# Patient Record
Sex: MALE | Race: WHITE | NOT HISPANIC OR LATINO | Employment: FULL TIME | URBAN - METROPOLITAN AREA
[De-identification: names, ages, dates, MRNs, and addresses within clinical notes are randomized per-mention and may not be internally consistent; named-entity substitution may affect disease eponyms.]

---

## 2020-10-22 ENCOUNTER — APPOINTMENT (OUTPATIENT)
Dept: RADIOLOGY | Facility: CLINIC | Age: 55
End: 2020-10-22
Payer: OTHER MISCELLANEOUS

## 2020-10-22 ENCOUNTER — OFFICE VISIT (OUTPATIENT)
Dept: OBGYN CLINIC | Facility: CLINIC | Age: 55
End: 2020-10-22
Payer: OTHER MISCELLANEOUS

## 2020-10-22 VITALS
SYSTOLIC BLOOD PRESSURE: 141 MMHG | DIASTOLIC BLOOD PRESSURE: 80 MMHG | HEIGHT: 72 IN | TEMPERATURE: 98.5 F | WEIGHT: 230 LBS | BODY MASS INDEX: 31.15 KG/M2

## 2020-10-22 DIAGNOSIS — M25.561 RIGHT KNEE PAIN, UNSPECIFIED CHRONICITY: ICD-10-CM

## 2020-10-22 DIAGNOSIS — M17.11 PRIMARY OSTEOARTHRITIS OF RIGHT KNEE: Primary | ICD-10-CM

## 2020-10-22 PROCEDURE — 77073 BONE LENGTH STUDIES: CPT

## 2020-10-22 PROCEDURE — 73564 X-RAY EXAM KNEE 4 OR MORE: CPT

## 2020-10-22 PROCEDURE — 99204 OFFICE O/P NEW MOD 45 MIN: CPT | Performed by: ORTHOPAEDIC SURGERY

## 2020-10-22 RX ORDER — PRAVASTATIN SODIUM 20 MG
20 TABLET ORAL DAILY
COMMUNITY

## 2020-10-22 RX ORDER — FENOFIBRATE 160 MG/1
160 TABLET ORAL DAILY
COMMUNITY

## 2020-10-22 RX ORDER — CARVEDILOL 12.5 MG/1
12.5 TABLET ORAL 2 TIMES DAILY WITH MEALS
COMMUNITY

## 2020-10-26 ENCOUNTER — EVALUATION (OUTPATIENT)
Dept: PHYSICAL THERAPY | Facility: CLINIC | Age: 55
End: 2020-10-26
Payer: OTHER MISCELLANEOUS

## 2020-10-26 DIAGNOSIS — M25.561 RIGHT KNEE PAIN, UNSPECIFIED CHRONICITY: ICD-10-CM

## 2020-10-26 DIAGNOSIS — M17.11 PRIMARY OSTEOARTHRITIS OF RIGHT KNEE: Primary | ICD-10-CM

## 2020-10-26 PROCEDURE — 97110 THERAPEUTIC EXERCISES: CPT | Performed by: PHYSICAL THERAPIST

## 2020-10-26 PROCEDURE — 97162 PT EVAL MOD COMPLEX 30 MIN: CPT | Performed by: PHYSICAL THERAPIST

## 2020-11-03 ENCOUNTER — OFFICE VISIT (OUTPATIENT)
Dept: PHYSICAL THERAPY | Facility: CLINIC | Age: 55
End: 2020-11-03
Payer: OTHER MISCELLANEOUS

## 2020-11-03 DIAGNOSIS — M25.561 RIGHT KNEE PAIN, UNSPECIFIED CHRONICITY: ICD-10-CM

## 2020-11-03 DIAGNOSIS — M17.11 PRIMARY OSTEOARTHRITIS OF RIGHT KNEE: Primary | ICD-10-CM

## 2020-11-03 PROCEDURE — 97110 THERAPEUTIC EXERCISES: CPT | Performed by: PHYSICAL THERAPIST

## 2020-11-03 PROCEDURE — 97140 MANUAL THERAPY 1/> REGIONS: CPT | Performed by: PHYSICAL THERAPIST

## 2020-11-10 ENCOUNTER — OFFICE VISIT (OUTPATIENT)
Dept: PHYSICAL THERAPY | Facility: CLINIC | Age: 55
End: 2020-11-10
Payer: OTHER MISCELLANEOUS

## 2020-11-10 DIAGNOSIS — M17.11 PRIMARY OSTEOARTHRITIS OF RIGHT KNEE: Primary | ICD-10-CM

## 2020-11-10 DIAGNOSIS — M25.561 RIGHT KNEE PAIN, UNSPECIFIED CHRONICITY: ICD-10-CM

## 2020-11-10 PROCEDURE — 97140 MANUAL THERAPY 1/> REGIONS: CPT | Performed by: PHYSICAL THERAPIST

## 2020-11-10 PROCEDURE — 97110 THERAPEUTIC EXERCISES: CPT | Performed by: PHYSICAL THERAPIST

## 2020-11-12 ENCOUNTER — OFFICE VISIT (OUTPATIENT)
Dept: PHYSICAL THERAPY | Facility: CLINIC | Age: 55
End: 2020-11-12
Payer: OTHER MISCELLANEOUS

## 2020-11-12 DIAGNOSIS — M25.561 RIGHT KNEE PAIN, UNSPECIFIED CHRONICITY: ICD-10-CM

## 2020-11-12 DIAGNOSIS — M17.11 PRIMARY OSTEOARTHRITIS OF RIGHT KNEE: Primary | ICD-10-CM

## 2020-11-12 PROCEDURE — 97140 MANUAL THERAPY 1/> REGIONS: CPT | Performed by: PHYSICAL THERAPIST

## 2020-11-12 PROCEDURE — 97110 THERAPEUTIC EXERCISES: CPT | Performed by: PHYSICAL THERAPIST

## 2020-11-17 ENCOUNTER — OFFICE VISIT (OUTPATIENT)
Dept: PHYSICAL THERAPY | Facility: CLINIC | Age: 55
End: 2020-11-17
Payer: OTHER MISCELLANEOUS

## 2020-11-17 DIAGNOSIS — M25.561 RIGHT KNEE PAIN, UNSPECIFIED CHRONICITY: ICD-10-CM

## 2020-11-17 DIAGNOSIS — M17.11 PRIMARY OSTEOARTHRITIS OF RIGHT KNEE: Primary | ICD-10-CM

## 2020-11-17 PROCEDURE — 97110 THERAPEUTIC EXERCISES: CPT | Performed by: PHYSICAL THERAPIST

## 2020-11-17 PROCEDURE — 97112 NEUROMUSCULAR REEDUCATION: CPT | Performed by: PHYSICAL THERAPIST

## 2020-11-17 PROCEDURE — 97530 THERAPEUTIC ACTIVITIES: CPT | Performed by: PHYSICAL THERAPIST

## 2020-11-17 PROCEDURE — 97140 MANUAL THERAPY 1/> REGIONS: CPT | Performed by: PHYSICAL THERAPIST

## 2020-11-20 ENCOUNTER — OFFICE VISIT (OUTPATIENT)
Dept: OBGYN CLINIC | Facility: CLINIC | Age: 55
End: 2020-11-20
Payer: OTHER MISCELLANEOUS

## 2020-11-20 VITALS
HEIGHT: 72 IN | DIASTOLIC BLOOD PRESSURE: 70 MMHG | WEIGHT: 230 LBS | TEMPERATURE: 98.1 F | BODY MASS INDEX: 31.15 KG/M2 | SYSTOLIC BLOOD PRESSURE: 123 MMHG

## 2020-11-20 DIAGNOSIS — M17.11 PRIMARY OSTEOARTHRITIS OF RIGHT KNEE: Primary | ICD-10-CM

## 2020-11-20 PROCEDURE — 99213 OFFICE O/P EST LOW 20 MIN: CPT | Performed by: ORTHOPAEDIC SURGERY

## 2020-11-24 ENCOUNTER — APPOINTMENT (OUTPATIENT)
Dept: PHYSICAL THERAPY | Facility: CLINIC | Age: 55
End: 2020-11-24
Payer: OTHER MISCELLANEOUS

## 2020-11-30 ENCOUNTER — OFFICE VISIT (OUTPATIENT)
Dept: PHYSICAL THERAPY | Facility: CLINIC | Age: 55
End: 2020-11-30
Payer: OTHER MISCELLANEOUS

## 2020-11-30 DIAGNOSIS — M25.561 RIGHT KNEE PAIN, UNSPECIFIED CHRONICITY: ICD-10-CM

## 2020-11-30 DIAGNOSIS — M17.11 PRIMARY OSTEOARTHRITIS OF RIGHT KNEE: Primary | ICD-10-CM

## 2020-11-30 PROCEDURE — 97530 THERAPEUTIC ACTIVITIES: CPT | Performed by: PHYSICAL THERAPIST

## 2020-11-30 PROCEDURE — 97112 NEUROMUSCULAR REEDUCATION: CPT | Performed by: PHYSICAL THERAPIST

## 2020-12-07 ENCOUNTER — OFFICE VISIT (OUTPATIENT)
Dept: PHYSICAL THERAPY | Facility: CLINIC | Age: 55
End: 2020-12-07
Payer: OTHER MISCELLANEOUS

## 2020-12-07 DIAGNOSIS — M17.11 PRIMARY OSTEOARTHRITIS OF RIGHT KNEE: Primary | ICD-10-CM

## 2020-12-07 DIAGNOSIS — M25.561 RIGHT KNEE PAIN, UNSPECIFIED CHRONICITY: ICD-10-CM

## 2020-12-07 PROCEDURE — 97110 THERAPEUTIC EXERCISES: CPT | Performed by: PHYSICAL THERAPIST

## 2020-12-14 ENCOUNTER — OFFICE VISIT (OUTPATIENT)
Dept: PHYSICAL THERAPY | Facility: CLINIC | Age: 55
End: 2020-12-14
Payer: OTHER MISCELLANEOUS

## 2020-12-14 DIAGNOSIS — M25.561 RIGHT KNEE PAIN, UNSPECIFIED CHRONICITY: ICD-10-CM

## 2020-12-14 DIAGNOSIS — M17.11 PRIMARY OSTEOARTHRITIS OF RIGHT KNEE: Primary | ICD-10-CM

## 2020-12-14 PROCEDURE — 97110 THERAPEUTIC EXERCISES: CPT | Performed by: PHYSICAL THERAPIST

## 2020-12-14 PROCEDURE — 97530 THERAPEUTIC ACTIVITIES: CPT | Performed by: PHYSICAL THERAPIST

## 2020-12-21 ENCOUNTER — OFFICE VISIT (OUTPATIENT)
Dept: PHYSICAL THERAPY | Facility: CLINIC | Age: 55
End: 2020-12-21
Payer: OTHER MISCELLANEOUS

## 2020-12-21 DIAGNOSIS — M25.561 RIGHT KNEE PAIN, UNSPECIFIED CHRONICITY: ICD-10-CM

## 2020-12-21 DIAGNOSIS — M17.11 PRIMARY OSTEOARTHRITIS OF RIGHT KNEE: Primary | ICD-10-CM

## 2020-12-21 PROCEDURE — 97110 THERAPEUTIC EXERCISES: CPT | Performed by: PHYSICAL THERAPIST

## 2020-12-21 PROCEDURE — 97112 NEUROMUSCULAR REEDUCATION: CPT | Performed by: PHYSICAL THERAPIST

## 2020-12-23 ENCOUNTER — APPOINTMENT (OUTPATIENT)
Dept: PHYSICAL THERAPY | Facility: CLINIC | Age: 55
End: 2020-12-23
Payer: OTHER MISCELLANEOUS

## 2020-12-28 ENCOUNTER — OFFICE VISIT (OUTPATIENT)
Dept: PHYSICAL THERAPY | Facility: CLINIC | Age: 55
End: 2020-12-28
Payer: OTHER MISCELLANEOUS

## 2020-12-28 DIAGNOSIS — M17.11 PRIMARY OSTEOARTHRITIS OF RIGHT KNEE: Primary | ICD-10-CM

## 2020-12-28 DIAGNOSIS — M25.561 RIGHT KNEE PAIN, UNSPECIFIED CHRONICITY: ICD-10-CM

## 2020-12-28 PROCEDURE — 97112 NEUROMUSCULAR REEDUCATION: CPT

## 2020-12-28 PROCEDURE — 97530 THERAPEUTIC ACTIVITIES: CPT

## 2020-12-28 PROCEDURE — 97110 THERAPEUTIC EXERCISES: CPT

## 2020-12-30 ENCOUNTER — APPOINTMENT (OUTPATIENT)
Dept: PHYSICAL THERAPY | Facility: CLINIC | Age: 55
End: 2020-12-30
Payer: OTHER MISCELLANEOUS

## 2021-01-04 ENCOUNTER — OFFICE VISIT (OUTPATIENT)
Dept: PHYSICAL THERAPY | Facility: CLINIC | Age: 56
End: 2021-01-04
Payer: OTHER MISCELLANEOUS

## 2021-01-04 DIAGNOSIS — M17.11 PRIMARY OSTEOARTHRITIS OF RIGHT KNEE: Primary | ICD-10-CM

## 2021-01-04 DIAGNOSIS — M25.561 RIGHT KNEE PAIN, UNSPECIFIED CHRONICITY: ICD-10-CM

## 2021-01-04 PROCEDURE — 97112 NEUROMUSCULAR REEDUCATION: CPT

## 2021-01-04 PROCEDURE — 97530 THERAPEUTIC ACTIVITIES: CPT

## 2021-01-04 PROCEDURE — 97110 THERAPEUTIC EXERCISES: CPT

## 2021-01-04 NOTE — PROGRESS NOTES
Daily Note     Today's date: 2021  Patient name: Kalpesh Licea  : 1965  MRN: [id number]  Referring provider: Will Kraft MD  Dx:   Encounter Diagnosis     ICD-10-CM    1  Primary osteoarthritis of right knee  M17 11    2  Right knee pain, unspecified chronicity  M25 561                   Subjective: Patient reports that his knee if feeling much better  He has a fallow-up with Dr Heath Ramírez next Monday  Objective: See treatment diary below      Assessment: Tolerated treatment well  Patient demonstrated good eccentric control and knee stability throughout session  Fatigue noted post session and patient would benefit from continued PT  Progress as tolerated  Plan: continue per plan of care       Precautions: R knee OA      Manuals  1   STM, PROM stretch JZ         jt mobs JZ         MRE's JZ                   Neuro Re-Ed  14   SLS rebounder R 3x10         Tandem          SLS foam rebounder R 3x10         Elizabeth abd kick 6/ 3x10ea         Lat step down hop  6" 3x10        Lat box hop  6" 3x10        U/l fwd/back hop  2x10 ea   2x15     SLS hip abd  3x10        2 foot lat square hop  3x10   2x15     Ther Ex  1   Hamstring stretch long sit          PKFS strap          Bridge figure 4          U/l heel raise 3x10  3x10       Calf stretch    :15x3 :15x5 :15x5 20"x5   Soleus stretch    :15x3 :15x5 :15x5 20"x5   Standing knee flexion with hip extension          TB side step          elizabeth kick back          3 way hamstring stretch  :15x3 :15x3       Elizabeth forward kick 12/ 3x10 ea         Slider lunge 3x10 3x10        LP u/l in PF high    110/ 2x12ea 150/ 3x10 150/ 3x10 150/ 3x10   LP b/l in PF mid    170/ 3x12 150/ 3x10 150/ 3x10 150/ 3x10   LP b/l in PF low     150/ 3x10 150/ 3x10 150/ 3x10   LP u/l ankle iso high K high A   110/ 1 min 2xea 110/ 1 min 1x ea  110/ 1 min 2x ea 110/ 1 min 2x ea   LP u/l ankle iso mid K high A   110/ 1 min 2xea 110/ 1 min 2x ea 190/ 1 min x 2 ea 190/ 1 min x2 ea 190/ 1 min x2 ea   U/l LP high A   70/ 3x10 u/l 70/ 3x10 u/l 90/ 3x10 90/ 3x10 90/ 3x10   TB ankle PF iso high k   3x10 M+B       LP u/l ankle iso deep K and high A   90/ 1 min ea x 2 90/1 min ea x 2       High wall sit u/l in PF    1 min x 2 ea      B/l calf press      150/ 3x10 150/ 3x10 150# 3x10             Ther Activity 11/17 11/30 12/7 12/14 12/21 12/28 1/4   Lateral step down 8" 3x10 8" 3x10  8" 3x10  8" 3x10 8" 3x10   Forward step down 8" 3x10 8" 3x10  8" 3x10  8" 3x10 8" 3x10   Lunge    3x10                          Gait Training                              Modalities 11/17 11/30 12/14 12/21               CT/vaso

## 2021-01-11 ENCOUNTER — OFFICE VISIT (OUTPATIENT)
Dept: OBGYN CLINIC | Facility: CLINIC | Age: 56
End: 2021-01-11
Payer: OTHER MISCELLANEOUS

## 2021-01-11 VITALS
WEIGHT: 236 LBS | SYSTOLIC BLOOD PRESSURE: 120 MMHG | DIASTOLIC BLOOD PRESSURE: 82 MMHG | BODY MASS INDEX: 31.97 KG/M2 | TEMPERATURE: 97.2 F | HEIGHT: 72 IN

## 2021-01-11 DIAGNOSIS — M17.11 PRIMARY OSTEOARTHRITIS OF RIGHT KNEE: Primary | ICD-10-CM

## 2021-01-11 PROCEDURE — 99213 OFFICE O/P EST LOW 20 MIN: CPT | Performed by: ORTHOPAEDIC SURGERY

## 2021-01-11 NOTE — PROGRESS NOTES
Orthopaedic Surgery Note    CC: Right Knee Pain      HPI:  Mr Jaycob Schmidt is a 60 y  o male with a history of right knee  Updated history:  Patient reports that he feels he has made complete recovery at this point in time  He is back to all his activities and he has no pain  He is very pleased with outcome at this point in time  Updated history:  Reports he is making progress but feels the knee is not completely back to normal   Feels progress with PT  He is working and this is not problematic  Previous history:  Patient presents as a workers comp case  He works as a  at a school  Patient was moving bleachers, picnic tables, etc at school and noted the start of soreness in the right knee - he does not note any specific injury that he can recall  He thinks that this started about 2 weeks ago  Pain had progressively gotten worse  He noted associated swelling as well  Pain is located on the anterior aspect of the knee and the posterior aspect of the proximal calf  Walking makes his pain worse  Icing, OTC NSAIDs, and staying off the knee makes it better  Pain impacts work and his ADL  He saw his PCP yesterday and received right knee steroid injection  He feels a lot better after the steroid injection, but not completely resolved  Patient denies previous injury, surgery, or injection  He has not done PT  Patient is still working, but has been sitting more and doing more sedentary work  Denies locking, instability, distal numbness or tingling  ALLERGIES:  No Known Allergies    CURRENT MEDICATIONS:  Current Outpatient Medications   Medication Sig Dispense Refill    carvedilol (COREG) 12 5 mg tablet Take 12 5 mg by mouth 2 (two) times a day with meals      fenofibrate (TRIGLIDE) 160 MG tablet Take 160 mg by mouth daily      pravastatin (PRAVACHOL) 20 mg tablet Take 20 mg by mouth daily       No current facility-administered medications for this visit          PAST MEDICAL HISTORY  History reviewed  No pertinent past medical history  SURGICAL HISTORY  Past Surgical History:   Procedure Laterality Date    SHOULDER SURGERY         FAMILY HISTORY  Family History   Problem Relation Age of Onset    Hypertension Mother     Glaucoma Father     Hyperlipidemia Father     Hypertension Father        SOCIAL HISTORY  Social History     Socioeconomic History    Marital status: /Civil Union     Spouse name: Not on file    Number of children: Not on file    Years of education: Not on file    Highest education level: Not on file   Occupational History    Not on file   Social Needs    Financial resource strain: Not on file    Food insecurity     Worry: Not on file     Inability: Not on file   [employer] needs     Medical: Not on file     Non-medical: Not on file   Tobacco Use    Smoking status: Never Smoker    Smokeless tobacco: Never Used   Substance and Sexual Activity    Alcohol use: Not on file    Drug use: Not on file    Sexual activity: Not on file   Lifestyle    Physical activity     Days per week: Not on file     Minutes per session: Not on file    Stress: Not on file   Relationships    Social connections     Talks on phone: Not on file     Gets together: Not on file     Attends Buddhism service: Not on file     Active member of club or organization: Not on file     Attends meetings of clubs or organizations: Not on file     Relationship status: Not on file    Intimate partner violence     Fear of current or ex partner: Not on file     Emotionally abused: Not on file     Physically abused: Not on file     Forced sexual activity: Not on file   Other Topics Concern    Not on file   Social History Narrative    Not on file         Review of Systems   Metal Allergy: no  History of MRSA: no  History of DVT or PE: no  Active dental issues: no  Anesthesia complications: no    Otherwise negative except per above and HPI      Physical Exam    Vitals  Vitals: 01/11/21 0912   BP: 120/82   Temp: (!) 97 2 °F (36 2 °C)       BMI  Body mass index is 32 01 kg/m²  GENERAL: No acute distress  Alert and oriented  Well nourished and well hydrated  Appears stated age  HEENT : Normocephalic, atraumatic  Extraocular movements intact  Mask in place  NECK: Supple, trachea midline  LUNGS: Adequate and symmetric respiratory effort  No intercostal retractions or accessory muscle use  HEART: Extremities warm and perfused  ABDOMEN: Nondistended  SKIN: Warm and dry, no rash  Right Knee   Inspection/Appearance:       Swelling: No      Patella is midline  Alignment:  Knee is in mild varus  Palpation - Soft Tissue: normal without effusion  ROM:       Extension - 0          Flexion - 120      extensor lag: no    Stability:  demonstrates no varus, valgus, anterior drawer or posterior drawer  Patella: stable, tracks normally  Angelic: negative  Imaging  No new imaging  Assessment and Plan  Right Knee Arthritis      - Reports that PT has really provided him significant relief  He reports he is back to all ADLs and home activities  He is very pleased  - Recommend continued PT and HEP and did demonstrate partial body weight squat to carolin zapien he can consider incorporating instead of squat machine he states he used at PT     - followup as needed, if repeat pain, would recommend CSI  Cleopatra Thao MD  Adult Reconstruction Surgery  [facility] [place] [id number]  9:43 AM

## 2021-01-14 NOTE — PROGRESS NOTES
Pt has achieved all of his functional goals   Stated that he will not be returning to PT because "I just don't need it any more "

## 2022-08-11 ENCOUNTER — TELEPHONE (OUTPATIENT)
Dept: OBGYN CLINIC | Facility: HOSPITAL | Age: 57
End: 2022-08-11

## 2022-08-11 ENCOUNTER — OFFICE VISIT (OUTPATIENT)
Dept: OBGYN CLINIC | Facility: CLINIC | Age: 57
End: 2022-08-11
Payer: OTHER MISCELLANEOUS

## 2022-08-11 VITALS
SYSTOLIC BLOOD PRESSURE: 122 MMHG | BODY MASS INDEX: 31.97 KG/M2 | WEIGHT: 236 LBS | DIASTOLIC BLOOD PRESSURE: 78 MMHG | HEIGHT: 72 IN

## 2022-08-11 DIAGNOSIS — M23.92 LOCKING KNEE, LEFT: ICD-10-CM

## 2022-08-11 DIAGNOSIS — M25.562 ACUTE PAIN OF LEFT KNEE: Primary | ICD-10-CM

## 2022-08-11 DIAGNOSIS — M25.562 KNEE MENISCUS PAIN, LEFT: ICD-10-CM

## 2022-08-11 DIAGNOSIS — S83.105A ACUTE TRAUMATIC INTERNAL DERANGEMENT OF LEFT KNEE, INITIAL ENCOUNTER: ICD-10-CM

## 2022-08-11 PROCEDURE — 99213 OFFICE O/P EST LOW 20 MIN: CPT | Performed by: PHYSICIAN ASSISTANT

## 2022-08-11 NOTE — LETTER
August 11, 2022     Patient: Cuco Hector  YOB: 1965  Date of Visit: 8/11/2022      To Whom it May Concern:    Cuco Hector is under my professional care  Kdae Caicedo was seen in my office on 8/11/2022  Kade Caicedo may return to work with limitations :  Primarily sedentary duty only  Sit stand as needed with use of crutches  If you have any questions or concerns, please don't hesitate to call           Sincerely,          Amie Cochran PA-C        CC: No Recipients

## 2022-08-11 NOTE — PROGRESS NOTES
Orthopaedic Surgery - Office Note  Tray Santoyo (48 y o  male)   : 1965   MRN: [id number]  Encounter Date: 2022    Chief Complaint   Patient presents with    Left Knee - Pain         Assessment/Plan  Diagnoses and all orders for this visit:    Acute pain of left knee  -     MRI knee left  wo contrast; Future  -     Durable Medical Equipment  -     Ambulatory Referral to Physical Therapy; Future    Acute traumatic internal derangement of left knee, initial encounter  -     MRI knee left  wo contrast; Future  -     Durable Medical Equipment  -     Ambulatory Referral to Physical Therapy; Future    Knee meniscus pain, left  -     MRI knee left  wo contrast; Future  -     Durable Medical Equipment  -     Ambulatory Referral to Physical Therapy; Future    Locking knee, left  -     Durable Medical Equipment  -     Ambulatory Referral to Physical Therapy; Future    Augustina Whittaker had an acute work related injury on 2022  I am concerned for an acute meniscal tear which is causing significant mechanical symptoms and inability to bear weight  He will be given an Echo hinged knee sleeve and continue the crutches to avoid limping today  I will order an MRI to evaluate for an acute meniscus tear he will follow up to discuss the results with an orthopedic surgeon  In the interim he will be started in physical therapy to help maintain and range of motion and prevent muscle atrophy and wasting  Patient will ice the knee for 20 minutes on 1 hour off 3 times a day  He will use Aleve 1 tablet twice daily with food stopping calling if any stomach upset occurs  He will use Tylenol as needed for breakthrough pain  Patient will be given light duty restrictions today  Return to discuss the MRI with Dr Edith Kincaid  History of Present Illness  Augustina Whittaker is a new patient who was injured at work on 2022    He reports he was stepping up into a trailer to get a  whenever he felt a large pop and immediate pain in the left knee  He does not recall the exact mechanism injury  He was then seen at the emergency department where he had x-rays taken which are available for review  He reports that after the injury he was unable to bear weight and noted significant swelling in the knee  He has noticed that any type of twisting injury now causes a sharp pain in the inside and outside of the knee  He has had occasional locking in the knee since the injury with difficulty ambulating  He reports he is unable to tolerate the knee immobilizer but has been using the crutches from the emergency department  He denies problems with this knee in the past   He reports he is a very active individual and is a  at a local school  Review of Systems  Pertinent items are noted in HPI  All other systems were reviewed and are negative  Physical Exam  /78   Ht 6' (1 829 m)   Wt 107 kg (236 lb)   BMI 32 01 kg/m²   Cons: Appears well  No apparent distress  Psych: Alert  Oriented x3  Mood and affect normal   Eyes: PERRLA, EOMI  Resp: Normal effort  No audible wheezing or stridor  CV: Palpable pulse  No discernable arrhythmia  Lymph:  No palpable cervical, axillary, or inguinal lymphadenopathy  Skin: Warm  No palpable masses  No visible lesions  Neuro: Normal muscle tone  Normal and symmetric DTR's  Patient's left knee is with trace effusion in the office today  His exam is guarded secondary to pain  He is tender on the mediolateral joint line  He has no pain or instability to valgus or varus stressing  He has trace instability to Lachman's  No instability to posterior drawer  He has a positive medial Angelic's  Lateral Angelic's is equivocal due to patient guarding  He has a very guarded range of motion and will slowly get to extension of 0  Flexion is to about 110 with end motion pain  Quad and hamstring strength are decreased to 4/5    He is nontender to palpation on the mediolateral border the patella  He is nontender to palpation at the distal quad and patella tendon  He has a slight extensor lag but appears to have an intact extensor mechanism without any palpable defect  He has a negative straight leg raise  Gait is antalgic favoring the left side  He has pain with bearing weight  Studies Reviewed  X-ray images as well as reports were reviewed by myself in the office today and I agree with radiologist interpretation of no acute fracture or dislocation  Scanned emergency department notes were reviewed by myself in the office today  Procedures  No procedures today  Medical, Surgical, Family, and Social History  The patient's medical history, family history, and social history, were reviewed and updated as appropriate  History reviewed  No pertinent past medical history      Past Surgical History:   Procedure Laterality Date    SHOULDER SURGERY         Family History   Problem Relation Age of Onset   Earna Ame Hypertension Mother     Glaucoma Father     Hyperlipidemia Father     Hypertension Father        Social History     Occupational History    Not on file   Tobacco Use    Smoking status: Never Smoker    Smokeless tobacco: Never Used   Substance and Sexual Activity    Alcohol use: Not on file    Drug use: Not on file    Sexual activity: Not on file       No Known Allergies      Current Outpatient Medications:     carvedilol (COREG) 12 5 mg tablet, Take 12 5 mg by mouth 2 (two) times a day with meals, Disp: , Rfl:     fenofibrate (TRIGLIDE) 160 MG tablet, Take 160 mg by mouth daily, Disp: , Rfl:     pravastatin (PRAVACHOL) 20 mg tablet, Take 20 mg by mouth daily, Disp: , Rfl:       [employer], PA-C

## 2022-08-11 NOTE — PATIENT INSTRUCTIONS
Hinged Knee Brace   WHAT YOU NEED TO KNOW:   How might a hinged knee brace help me? A hinged knee brace can support and stabilize an injured knee  It can limit movement while your knee heals after injury or surgery  It may also reduce pain and pressure if you have arthritis in your knee  Your healthcare provider will tell you the kind of brace to use and how long to use it  How do I safely use a hinged knee brace? Get your knee brace fitted by your healthcare provider  It is very important that your brace is the right size for you and that it fits properly  Your healthcare provider will fit you with a custom brace or tell you where to buy a brace  When you put on the brace, make sure the hinges are in the right place  Fasten straps and loops correctly  Ask your healthcare provider if you have any questions about how to wear your brace properly  Wear your brace during sports or activities as directed  Also wear it during any activity that could injure your knee  Check the fit of the brace often  If it does not fit properly or slips out of place, it could cause more injury  Inspect your skin often  Your skin may become irritated, red, or dry where it rubs against the brace  Check your skin for sores or other problems  Ask your healthcare provider if you can cover sore areas with a bandage  Your provider may also recommend a cream to apply to the skin to soothe it  Ask your healthcare provider how to care for your brace  You may be able to wash the fabric with mild soap and water  Inspect your brace often  Do not wear your brace if it is damaged or broken  You may need to replace it if it becomes worn  Continue to stretch and strengthen your knee as directed  You may need to work with a physical therapist to strengthen your knee  Your healthcare provider will tell you which activities are safe for you, and how much activity you can get  When should I seek immediate care?    You have severe knee pain or swelling  You cannot move or put weight on your injured leg  When should I contact my healthcare provider? Your knee pain returns or becomes worse when you wear your brace  Your skin is sore or raw after you wear your brace  Your leg goes numb while you are wearing your brace  Your brace is damaged or broken  You have questions or concerns about your condition or care  CARE AGREEMENT:   You have the right to help plan your care  Learn about your health condition and how it may be treated  Discuss treatment options with your healthcare providers to decide what care you want to receive  You always have the right to refuse treatment  The above information is an  only  It is not intended as medical advice for individual conditions or treatments  Talk to your doctor, nurse or pharmacist before following any medical regimen to see if it is safe and effective for you  © Copyright [employer] 2022 Information is for End User's use only and may not be sold, redistributed or otherwise used for commercial purposes  All illustrations and images included in CareNotes® are the copyrighted property of A D A M , Inc  or [facility]  Meniscus Tear   WHAT YOU NEED TO KNOW:   What is a meniscus tear? A meniscus tear is a tear in the cartilage of your knee  The meniscus is a piece of cartilage (strong tissue) between your thighbone and shinbone  The meniscus helps to cushion your knee joint and keep it stable  What causes a meniscus tear? A meniscus tear can occur if you twist your knee  A meniscus tear can happen during sports that involve squatting and twisting the knee, such as football or basketball  Weak and thinned meniscus cartilage in older people can increase the risk for a meniscus tear  What are the signs and symptoms of a meniscus tear?    A pop or tear when the injury happens    Pain and swelling    Tenderness    Stiffness    Popping, catching, or locking of your knee    Not being able to extend your knee fully    How is a meniscus tear diagnosed? Your healthcare provider will examine your knee  He may bend your knee and then straighten and rotate it  He may also order x-rays and an MRI of your knee  An MRI takes pictures of your knee to show the meniscus tear  You may be given contrast liquid to help the tear show up better  Tell the healthcare provider if you have ever had an allergic reaction to contrast liquid  Do not enter the MRI room with anything metal  Metal can cause serious injury  Tell the healthcare provider if you have any metal in or on your body  How is a meniscus tear treated? Treatment depends on the type of tear you have  Some types of meniscus tears can heal on their own  You may need any of the following:  NSAIDs , such as ibuprofen, help decrease swelling, pain, and fever  This medicine is available with or without a doctor's order  NSAIDs can cause stomach bleeding or kidney problems in certain people  If you take blood thinner medicine, always ask if NSAIDs are safe for you  Always read the medicine label and follow directions  Do not give these medicines to children under 10months of age without direction from your child's healthcare provider  Rest  your knee  Avoid activities that make the swelling or pain worse  You may need to avoid putting weight on your leg while you have pain  Your healthcare provider may recommend that you use crutches  Apply ice  on your knee for 15 to 20 minutes every hour or as directed  Use an ice pack, or put crushed ice in a plastic bag  Cover it with a towel  Ice helps prevent tissue damage and decreases swelling and pain  Compress  your knee with an elastic bandage, air cast, medical boot, or splint to reduce swelling  Ask your healthcare provider which compression device to use, and how tight it should be  Elevate  your knee above the level of your heart as often as you can   This will help decrease swelling and pain  Prop your knee on pillows or blankets to keep it elevated comfortably  Surgery  may be needed if your symptoms do not improve  Your healthcare provider may trim away or repair damaged tissue  Call 911 for any of the following: Your arm or leg feels warm, tender, and painful  It may look swollen and red  When should I seek immediate care? You cannot move your knee at all  When should I contact my healthcare provider? Your symptoms do not improve with treatment  You have questions or concerns about your condition or care  CARE AGREEMENT:   You have the right to help plan your care  Learn about your health condition and how it may be treated  Discuss treatment options with your healthcare providers to decide what care you want to receive  You always have the right to refuse treatment  The above information is an  only  It is not intended as medical advice for individual conditions or treatments  Talk to your doctor, nurse or pharmacist before following any medical regimen to see if it is safe and effective for you  © Copyright [employer] 2022 Information is for End User's use only and may not be sold, redistributed or otherwise used for commercial purposes   All illustrations and images included in CareNotes® are the copyrighted property of [employer]  or [address] [place] [address] [employer]

## 2022-08-12 ENCOUNTER — TELEPHONE (OUTPATIENT)
Dept: OBGYN CLINIC | Facility: HOSPITAL | Age: 57
End: 2022-08-12

## 2022-08-18 ENCOUNTER — HOSPITAL ENCOUNTER (OUTPATIENT)
Dept: RADIOLOGY | Facility: IMAGING CENTER | Age: 57
Discharge: HOME | End: 2022-08-18
Payer: OTHER MISCELLANEOUS

## 2022-08-18 DIAGNOSIS — S83.105A ACUTE TRAUMATIC INTERNAL DERANGEMENT OF LEFT KNEE, INITIAL ENCOUNTER: ICD-10-CM

## 2022-08-18 DIAGNOSIS — M25.562 KNEE MENISCUS PAIN, LEFT: ICD-10-CM

## 2022-08-18 DIAGNOSIS — M25.562 ACUTE PAIN OF LEFT KNEE: ICD-10-CM

## 2022-08-18 PROCEDURE — G1004 CDSM NDSC: HCPCS

## 2022-08-18 PROCEDURE — 73721 MRI JNT OF LWR EXTRE W/O DYE: CPT

## 2022-08-22 ENCOUNTER — EVALUATION (OUTPATIENT)
Dept: PHYSICAL THERAPY | Facility: CLINIC | Age: 57
End: 2022-08-22
Payer: OTHER MISCELLANEOUS

## 2022-08-22 DIAGNOSIS — M23.92 LOCKING KNEE, LEFT: ICD-10-CM

## 2022-08-22 DIAGNOSIS — M23.92 LOCKING OF LEFT KNEE: ICD-10-CM

## 2022-08-22 DIAGNOSIS — S83.105A ACUTE TRAUMATIC INTERNAL DERANGEMENT OF LEFT KNEE, INITIAL ENCOUNTER: ICD-10-CM

## 2022-08-22 DIAGNOSIS — M25.562 KNEE MENISCUS PAIN, LEFT: ICD-10-CM

## 2022-08-22 DIAGNOSIS — M25.562 ACUTE PAIN OF LEFT KNEE: Primary | ICD-10-CM

## 2022-08-22 PROCEDURE — 97162 PT EVAL MOD COMPLEX 30 MIN: CPT | Performed by: PHYSICAL THERAPIST

## 2022-08-22 PROCEDURE — 97110 THERAPEUTIC EXERCISES: CPT | Performed by: PHYSICAL THERAPIST

## 2022-08-22 NOTE — LETTER
2022    [place] Cage [id number]    Patient: Juan C Chew   YOB: 1965   Date of Visit: 2022     Encounter Diagnosis     ICD-10-CM    1  Acute pain of left knee  M25 562 Ambulatory Referral to Physical Therapy   2  Locking of left knee  M23 92    3  Knee meniscus pain, left  M25 562 Ambulatory Referral to Physical Therapy   4  Acute traumatic internal derangement of left knee, initial encounter  S83 105A Ambulatory Referral to Physical Therapy   5  Locking knee, left  M23 92 Ambulatory Referral to Physical Therapy       Dear Dr Michael Courtney: Thank you for your recent referral of Juan C Chew  Please review the attached evaluation summary from Peter's recent visit  Please verify that you agree with the plan of care by signing the attached order  If you have any questions or concerns, please do not hesitate to call  I sincerely appreciate the opportunity to share in the care of one of your patients and hope to have another opportunity to work with you in the near future  Sincerely,    Rosanna Fischer, PT      Referring Provider:      I certify that I have read the below Plan of Care and certify the need for these services furnished under this plan of treatment while under my care  Mary Basurto PA-C  [address] [address]  [place]          PT Evaluation     Today's date: 2022  Patient name: Juan C Chew  : 1965  MRN: [id number]  Referring provider: PACO Mckenzie*  Dx:   Encounter Diagnosis     ICD-10-CM    1  Acute pain of left knee  M25 562    2  Locking of left knee  M23 92    3  Knee meniscus pain, left  M25 562    4   Acute traumatic internal derangement of left knee, initial encounter  S83 105A             Assessment  Assessment details: Juan C Chew is a 59 y o  male who presents with pain, decreased strength, decreased ROM, decreased joint mobility, joint effusion, ambulatory dysfunction and balance dysfunction  Due to these impairments, patient has difficulty performing ADL's, recreational activities, work-related activities, ambulation, stair negotiation, lifting/carrying, transfers  Patient's clinical presentation is consistent with their referring diagnosis of Acute pain of left knee  (primary encounter diagnosis), Locking of left knee, Knee meniscus pain, left, Acute traumatic internal derangement of left knee, initial encounter  Patient has been educated in home exercise program and plan of care  Patient would benefit from skilled physical therapy services to address their aforementioned functional limitations and progress towards prior level of function and independence with home exercise program      Impairments: abnormal gait, abnormal or restricted ROM, activity intolerance, impaired physical strength, lacks appropriate home exercise program, pain with function, weight-bearing intolerance, poor posture  and poor body mechanics  Functional limitations: walk/stand, STS, stair negotiation, squat, lunge/kneel, moving desks/safes for work   Prognosis: good  Prognosis details: + factors: high motivation levels  - factors: MRI + for meniscus     Goals  Short Term Goals to be accomplished by 9/19/22:  STG1: Pt will be I with HEP  STG2: Pt will be able to ascend steps without use of handrail  STG3: Pt will be able to perform STS without use of UE  STG4: Pt will amb community distance without gait deviates due to pain  Long Term Goals to be accomplished by 11/14/22:   LTG1: Pt will be able to step up/down off trailer at work for outdoor work duties  LTG2: Pt will be able to move school furniture, safe, desks  LTG3: Pt will be able to squat to lift objects at work repeatedly without knee pain        Plan  Plan details: HEP development, stretching, strengthening, A/AA/PROM, joint mobilizations, posture education, STM/MI as needed to reduce muscle tension, muscle reeducation, PLOC discussed and agreed upon with patient  Patient would benefit from: PT eval and skilled physical therapy  Planned modality interventions: cryotherapy, thermotherapy: hydrocollator packs and unattended electrical stimulation  Planned therapy interventions: manual therapy, neuromuscular re-education, self care, therapeutic activities, therapeutic exercise, home exercise program, patient education, joint mobilization, balance, strengthening, stretching, therapeutic training and flexibility  Frequency: 2x week  Duration in weeks: 12  Plan of Care beginning date: 2022  Plan of Care expiration date: 2022  Treatment plan discussed with: patient    Subjective Evaluation    History of Present Illness  Mechanism of injury: Pt is a 60 y/o male who presents to PT through [facility]  He works as a  for [employer]  DOI: 22  LAITH: He stated that he "tweaked" his knee when he was getting up onto a trailer at work  He reported the injury to his employer and he was sent to the ER at [facility] where x-ray showed no fracture  MRI was last Thursday,  And he has an appointment with ortho this Friday where he will review the results of his MRI and discuss a plan of care  He has already returned to work, however he is trying to stay off his feet as much as possible  He is not performing lifting activities  He is doing work duties     Pain  Current pain ratin  At best pain ratin  At worst pain ratin  Location: L knee   Quality: throbbing, tight, pressure and discomfort  Relieving factors: ice and relaxation  Aggravating factors: standing, walking, stair climbing, sitting and lifting    Treatments  Current treatment: physical therapy  Patient Goals  Patient goals for therapy: increased motion, improved balance, decreased pain, increased strength, independence with ADLs/IADLs, return to sport/leisure activities and return to work  Patient goal: STS, stair negotiation, squat, lunge, kneel, work duties, lifting/carrying     Objective     Observations   Left Knee   Positive for edema  Additional Observation Details  Brace during standing/walking    Tenderness   Left Knee   Tenderness in the ITB, MCL (distal), MCL (proximal), medial joint line and pes anserinus  Active Range of Motion   Left Knee   Flexion: 122 degrees with pain  Extension: 0 degrees with pain    Right Knee   Flexion: 145 degrees   Extension: 0 degrees     Mobility   Patellar Mobility:   Left Knee   Hypomobile: left medial, left lateral, left superior and left inferior    Strength/Myotome Testing     Left Knee   Flexion: 4-  Extension: 4-    Additional Strength Details  U/l heel raise: NOT TESTED DUE TO PAIN  R: reps  L: reps     Tests     Left Knee   Negative valgus stress test at 30 degrees and varus stress test at 30 degrees       Additional Tests Details  SLS NOT TESTED DUE TO PAIN  R: sec  L: sec    Meniscus ST not performed due to MRI confirmation     Precautions:   History of R knee injury     Manuals 8/22        STM  JZ        PROM stretch JZ        Pat mobs  JZ                 Neuro Re-Ed 8/22                                                                       Ther Ex 8/22        SLR 20x         S/l hip abd 20x         Seated hamstring stretch 3 way :30x         Prone knee flexion stretch strap PAIN                                            Ther Activity 8/22        Step ups         Lateral step downs         STS         U/l LP in plantar flexion                                    Modalities 8/22

## 2022-08-22 NOTE — PROGRESS NOTES
PT Evaluation     Today's date: 2022  Patient name: Joey Livingston  : 1965  MRN: [id number]  Referring provider: PACO Aguiar*  Dx:   Encounter Diagnosis     ICD-10-CM    1  Acute pain of left knee  M25 562    2  Locking of left knee  M23 92    3  Knee meniscus pain, left  M25 562    4  Acute traumatic internal derangement of left knee, initial encounter  S83 105A             Assessment  Assessment details: Joey Livingston is a 59 y o  male who presents with pain, decreased strength, decreased ROM, decreased joint mobility, joint effusion, ambulatory dysfunction and balance dysfunction  Due to these impairments, patient has difficulty performing ADL's, recreational activities, work-related activities, ambulation, stair negotiation, lifting/carrying, transfers  Patient's clinical presentation is consistent with their referring diagnosis of Acute pain of left knee  (primary encounter diagnosis), Locking of left knee, Knee meniscus pain, left, Acute traumatic internal derangement of left knee, initial encounter  Patient has been educated in home exercise program and plan of care  Patient would benefit from skilled physical therapy services to address their aforementioned functional limitations and progress towards prior level of function and independence with home exercise program      Impairments: abnormal gait, abnormal or restricted ROM, activity intolerance, impaired physical strength, lacks appropriate home exercise program, pain with function, weight-bearing intolerance, poor posture  and poor body mechanics  Functional limitations: walk/stand, STS, stair negotiation, squat, lunge/kneel, moving desks/safes for work   Prognosis: good  Prognosis details: + factors: high motivation levels  - factors: MRI + for meniscus     Goals  Short Term Goals to be accomplished by 22:  STG1: Pt will be I with HEP  STG2: Pt will be able to ascend steps without use of handrail    STG3: Pt will be able to perform STS without use of UE  STG4: Pt will amb community distance without gait deviates due to pain  Long Term Goals to be accomplished by 11/14/22:   LTG1: Pt will be able to step up/down off trailer at work for outdoor work duties  LTG2: Pt will be able to move school furniture, safe, desks  LTG3: Pt will be able to squat to lift objects at work repeatedly without knee pain  Plan  Plan details: HEP development, stretching, strengthening, A/AA/PROM, joint mobilizations, posture education, STM/MI as needed to reduce muscle tension, muscle reeducation, PLOC discussed and agreed upon with patient  Patient would benefit from: PT eval and skilled physical therapy  Planned modality interventions: cryotherapy, thermotherapy: hydrocollator packs and unattended electrical stimulation  Planned therapy interventions: manual therapy, neuromuscular re-education, self care, therapeutic activities, therapeutic exercise, home exercise program, patient education, joint mobilization, balance, strengthening, stretching, therapeutic training and flexibility  Frequency: 2x week  Duration in weeks: 12  Plan of Care beginning date: 8/22/2022  Plan of Care expiration date: 11/14/2022  Treatment plan discussed with: patient    Subjective Evaluation    History of Present Illness  Mechanism of injury: Pt is a 60 y/o male who presents to PT through [employer]  He works as a  for [employer]  DOI: 8/9/22  LAITH: He stated that he "tweaked" his knee when he was getting up onto a trailer at work  He reported the injury to his employer and he was sent to the ER at [facility] where x-ray showed no fracture  MRI was last Thursday,  And he has an appointment with ortho this Friday where he will review the results of his MRI and discuss a plan of care  He has already returned to work, however he is trying to stay off his feet as much as possible  He is not performing lifting activities   He is doing work duties  Pain  Current pain ratin  At best pain ratin  At worst pain ratin  Location: L knee   Quality: throbbing, tight, pressure and discomfort  Relieving factors: ice and relaxation  Aggravating factors: standing, walking, stair climbing, sitting and lifting    Treatments  Current treatment: physical therapy  Patient Goals  Patient goals for therapy: increased motion, improved balance, decreased pain, increased strength, independence with ADLs/IADLs, return to sport/leisure activities and return to work  Patient goal: STS, stair negotiation, squat, lunge, kneel, work duties, lifting/carrying     Objective     Observations   Left Knee   Positive for edema  Additional Observation Details  Brace during standing/walking    Tenderness   Left Knee   Tenderness in the ITB, MCL (distal), MCL (proximal), medial joint line and pes anserinus  Active Range of Motion   Left Knee   Flexion: 122 degrees with pain  Extension: 0 degrees with pain    Right Knee   Flexion: 145 degrees   Extension: 0 degrees     Mobility   Patellar Mobility:   Left Knee   Hypomobile: left medial, left lateral, left superior and left inferior    Strength/Myotome Testing     Left Knee   Flexion: 4-  Extension: 4-    Additional Strength Details  U/l heel raise: NOT TESTED DUE TO PAIN  R: reps  L: reps     Tests     Left Knee   Negative valgus stress test at 30 degrees and varus stress test at 30 degrees       Additional Tests Details  SLS NOT TESTED DUE TO PAIN  R: sec  L: sec    Meniscus ST not performed due to MRI confirmation     Precautions:   History of R knee injury     Manuals         STM  JZ        PROM stretch JZ        Pat mobs  TRICIA                 Neuro Re-Ed                                                                        Ther Ex         SLR 20x         S/l hip abd 20x         Seated hamstring stretch 3 way :30x each angle        Prone knee flexion stretch strap PAIN Ther Activity 8/22        Step ups         Lateral step downs         STS         U/l LP in plantar flexion                                    Modalities 8/22

## 2022-08-26 ENCOUNTER — OFFICE VISIT (OUTPATIENT)
Dept: OBGYN CLINIC | Facility: CLINIC | Age: 57
End: 2022-08-26
Payer: OTHER MISCELLANEOUS

## 2022-08-26 ENCOUNTER — APPOINTMENT (OUTPATIENT)
Dept: RADIOLOGY | Facility: CLINIC | Age: 57
End: 2022-08-26
Payer: COMMERCIAL

## 2022-08-26 VITALS
HEIGHT: 72 IN | DIASTOLIC BLOOD PRESSURE: 78 MMHG | SYSTOLIC BLOOD PRESSURE: 132 MMHG | BODY MASS INDEX: 31.69 KG/M2 | WEIGHT: 234 LBS

## 2022-08-26 DIAGNOSIS — M25.562 LEFT KNEE PAIN, UNSPECIFIED CHRONICITY: ICD-10-CM

## 2022-08-26 DIAGNOSIS — S83.232A COMPLEX TEAR OF MEDIAL MENISCUS OF LEFT KNEE AS CURRENT INJURY, INITIAL ENCOUNTER: Primary | ICD-10-CM

## 2022-08-26 PROCEDURE — 99213 OFFICE O/P EST LOW 20 MIN: CPT | Performed by: ORTHOPAEDIC SURGERY

## 2022-08-26 PROCEDURE — 73560 X-RAY EXAM OF KNEE 1 OR 2: CPT

## 2022-08-26 NOTE — PROGRESS NOTES
Assessment:     1  Complex tear, posterior horn, of medial meniscus of left knee as current injury, initial encounter        Plan:     Problem List Items Addressed This Visit    None     Visit Diagnoses     Complex tear, posterior horn, of medial meniscus of left knee as current injury, initial encounter    -  Primary    Relevant Orders    XR knee 1 or 2 vw left    Ambulatory Referral to Orthopedic Surgery       60 y/o male with a posterior horn, medial meniscus tear of his left knee after work injury on 8/9/2022  Explained to the patient that on MRI his medial meniscus tear might be repairable and this is recommended because if a meniscectomy is performed then a good portion of his meniscus would have to be removed and he would develop arthritis much faster  A referral will be made today to Dr Gonzales Thomas for further evaluation, medial meniscus posterior root repair of his left knee  He understood and all questions were answered  Continue use of hinged knee brace, take OTC meds and ice prn for pain relief  May continue with home exercises as tolerated  He will be placed on sedentary duty at work  Note was provided today  He will follow up on an as needed basis  All patient's questions were answered to his satisfaction  This note is created using dictation transcription  It may contain typographical errors, grammatical errors, improperly dictated words, background noise and other errors  Subjective:     Patient ID: Airam Alexander is a 59 y o  male  Chief Complaint:  60 y/o male who presents today for an orthopedic surgery consultation visit at the request of Sammie Tejada PA-C for further evaluation of his left knee  Patient reports that he suffered an injury while at work on 8/9/2022  He reports that he had his left leg/knee on the ground and was getting up on a trailer onto with his right leg  He believes that he twisted his left knee while it was on the ground getting into the trailer   He localizes his symptoms about the posteromedial aspect of his knee  He does reports intermittent "locking, catching" of his knee with ambulating  He has been wearing a hinged knee brace for comfort  No numbness or tingling  No fevers or chills  Information on patient's intake form was reviewed  Allergy:  No Known Allergies  Medications:  all current active meds have been reviewed  Past Medical History:  History reviewed  No pertinent past medical history  Past Surgical History:  Past Surgical History:   Procedure Laterality Date    SHOULDER SURGERY Bilateral     rotator cuff repair     Family History:  Family History   Problem Relation Age of Onset   Joshua [place] Hypertension Mother    Exyeison [place] Glaucoma Father     Hyperlipidemia Father     Hypertension Father      Social History:  Social History     Substance and Sexual Activity   Alcohol Use None     Social History     Substance and Sexual Activity   Drug Use Not on file     Social History     Tobacco Use   Smoking Status Never Smoker   Smokeless Tobacco Never Used     Review of Systems   Constitutional: Negative for chills, fatigue, fever and unexpected weight change  HENT: Negative for hearing loss, nosebleeds and sore throat  Eyes: Negative for pain, redness and visual disturbance  Respiratory: Negative for cough, shortness of breath and wheezing  Cardiovascular: Negative for chest pain, palpitations and leg swelling  Gastrointestinal: Negative for abdominal pain, nausea and vomiting  Endocrine: Negative for polydipsia and polyuria  Genitourinary: Negative for frequency and urgency  Skin: Negative for color change, rash and wound  Neurological: Negative for dizziness, weakness, numbness and headaches  Psychiatric/Behavioral: Negative for behavioral problems, self-injury and suicidal ideas           Objective:  BP Readings from Last 1 Encounters:   08/26/22 132/78      Wt Readings from Last 1 Encounters:   08/26/22 106 kg (234 lb)      BMI:   Estimated body mass index is 31 74 kg/m² as calculated from the following:    Height as of this encounter: 6' (1 829 m)  Weight as of this encounter: 106 kg (234 lb)  BSA:   Estimated body surface area is 2 28 meters squared as calculated from the following:    Height as of this encounter: 6' (1 829 m)  Weight as of this encounter: 106 kg (234 lb)  Physical Exam  Vitals and nursing note reviewed  Constitutional:       Appearance: Normal appearance  He is well-developed  HENT:      Head: Normocephalic and atraumatic  Right Ear: External ear normal       Left Ear: External ear normal    Eyes:      Extraocular Movements: Extraocular movements intact  Conjunctiva/sclera: Conjunctivae normal    Pulmonary:      Effort: Pulmonary effort is normal    Musculoskeletal:      Cervical back: Neck supple  Left knee: No effusion  Instability Tests: Medial Angelic test positive  Lateral Angelic test negative  Skin:     General: Skin is warm  Neurological:      Mental Status: He is alert and oriented to person, place, and time  Psychiatric:         Mood and Affect: Mood normal          Behavior: Behavior normal        Left Knee Exam     Tenderness   The patient is experiencing tenderness in the medial joint line  Range of Motion   The patient has normal left knee ROM  Tests   Angelic:  Medial - positive Lateral - negative  Varus: negative Valgus: negative  Patellar apprehension: negative    Other   Erythema: absent  Sensation: normal  Pulse: present  Swelling: none  Effusion: no effusion present            I have personally reviewed pertinent films in PACS and my interpretation is MRI Left Knee 8/18/2022 demonstrates a longitudinal peripheral tear of the posterior horn medial meniscus  There also appears to be a tear through the dorsal root of the medial meniscus  X Rays Left knee demonstrate minimal degenerative changes        Scribe Attestation    I,:  Hugo Fuentes am acting as a scribe while in the presence of the attending physician :       I,:  Sowmya Zambrano MD personally performed the services described in this documentation    as scribed in my presence :

## 2022-08-26 NOTE — LETTER
August 26, 2022     Patient: Alla Nichole  YOB: 1965  Date of Visit: 8/26/2022      To Whom it May Concern:    Alla Nichole is under my professional care  Tristan Velasco was seen in my office on 8/26/2022  Tristan Velasco is to be on sedentary duty only until follow up with Dr Edel Stone  If you have any questions or concerns, please don't hesitate to call           Sincerely,          Abigail Carvajal MD        CC: No Recipients

## 2022-08-30 ENCOUNTER — OFFICE VISIT (OUTPATIENT)
Dept: PHYSICAL THERAPY | Facility: CLINIC | Age: 57
End: 2022-08-30
Payer: OTHER MISCELLANEOUS

## 2022-08-30 DIAGNOSIS — M23.92 LOCKING OF LEFT KNEE: ICD-10-CM

## 2022-08-30 DIAGNOSIS — M25.562 ACUTE PAIN OF LEFT KNEE: ICD-10-CM

## 2022-08-30 DIAGNOSIS — M23.92 LOCKING KNEE, LEFT: Primary | ICD-10-CM

## 2022-08-30 DIAGNOSIS — M25.562 KNEE MENISCUS PAIN, LEFT: ICD-10-CM

## 2022-08-30 DIAGNOSIS — S83.105A ACUTE TRAUMATIC INTERNAL DERANGEMENT OF LEFT KNEE, INITIAL ENCOUNTER: ICD-10-CM

## 2022-08-30 PROCEDURE — 97110 THERAPEUTIC EXERCISES: CPT | Performed by: PHYSICAL THERAPIST

## 2022-08-30 PROCEDURE — 97140 MANUAL THERAPY 1/> REGIONS: CPT | Performed by: PHYSICAL THERAPIST

## 2022-08-30 PROCEDURE — 97530 THERAPEUTIC ACTIVITIES: CPT | Performed by: PHYSICAL THERAPIST

## 2022-08-30 NOTE — PROGRESS NOTES
Daily Note     Today's date: 2022  Patient name: Melina Kulkarni  : 1965  MRN: [id number]  Referring provider: PACO Roman*  Dx:   Encounter Diagnosis     ICD-10-CM    1  Locking knee, left  M23 92    2  Acute pain of left knee  M25 562    3  Locking of left knee  M23 92    4  Knee meniscus pain, left  M25 562    5  Acute traumatic internal derangement of left knee, initial encounter  S83 105A           Subjective: Pt reported that he has the appointment on 945 am Friday  Objective: See treatment diary below    Assessment: Tolerated treatment well  Patient demonstrated fatigue post treatment, exhibited good technique with therapeutic exercises and would benefit from continued PT  Plan: Continue per plan of care        Precautions:   History of R knee injury     Manuals        STM  JZ JZ       PROM stretch JZ JZ       Pat mobs  JZ JZ                Neuro Re-Ed                                                                       Ther Ex        SLR 20x         S/l hip abd 20x         Seated hamstring stretch 3 way :30x each angle :30x2 ea angle       Prone knee flexion stretch strap PAIN        SAQ  3x15 MRE       LAQ  3x15 MRE        U/l heel raise  2x10 v/c to maintain terminal knee extension                Ther Activity        Step ups         Lateral step downs  6"        STS         U/l LP in plantar flexion  70/ 10x  90/ 3x10        Forward step down  4" 10x   6" 10x   8" 10x        Prone knee flexion AROM  2x10                 Modalities

## 2022-09-01 ENCOUNTER — OFFICE VISIT (OUTPATIENT)
Dept: PHYSICAL THERAPY | Facility: CLINIC | Age: 57
End: 2022-09-01
Payer: OTHER MISCELLANEOUS

## 2022-09-01 DIAGNOSIS — M25.562 ACUTE PAIN OF LEFT KNEE: ICD-10-CM

## 2022-09-01 DIAGNOSIS — M23.92 LOCKING KNEE, LEFT: Primary | ICD-10-CM

## 2022-09-01 DIAGNOSIS — M23.92 LOCKING OF LEFT KNEE: ICD-10-CM

## 2022-09-01 DIAGNOSIS — M25.562 KNEE MENISCUS PAIN, LEFT: ICD-10-CM

## 2022-09-01 DIAGNOSIS — S83.105A ACUTE TRAUMATIC INTERNAL DERANGEMENT OF LEFT KNEE, INITIAL ENCOUNTER: ICD-10-CM

## 2022-09-01 PROCEDURE — 97530 THERAPEUTIC ACTIVITIES: CPT | Performed by: PHYSICAL THERAPIST

## 2022-09-01 PROCEDURE — 97110 THERAPEUTIC EXERCISES: CPT | Performed by: PHYSICAL THERAPIST

## 2022-09-01 PROCEDURE — 97140 MANUAL THERAPY 1/> REGIONS: CPT | Performed by: PHYSICAL THERAPIST

## 2022-09-01 NOTE — PROGRESS NOTES
Daily Note     Today's date: 2022  Patient name: Chema Dent  : 1965  MRN: [id number]  Referring provider: PACO Coyle*  Dx:   Encounter Diagnosis     ICD-10-CM    1  Locking knee, left  M23 92    2  Acute pain of left knee  M25 562    3  Locking of left knee  M23 92    4  Knee meniscus pain, left  M25 562    5  Acute traumatic internal derangement of left knee, initial encounter  S83 105A                   Subjective: Patient reports significant increase in knee pain following first treat  He was able to manage with ice and pain resolved within 24 hours  Objective: See treatment diary below      Assessment: prescribed activity slightly modified based on patient's response to last treatment  Step downs were most challenging activity for patient, with heavy cueing and use of HHA required to avoid excessive knee valgum  Progress, as able  Plan: Continue per plan of care        Precautions:   History of R knee injury     Manuals       STM  JZ JZ       PROM stretch JZ JSOREN DIXONB      Pat mobs  JZ TRICIA DIXONB               Neuro Re-Ed                                                                       Ther Ex       SLR 20x         S/l hip abd 20x         Seated hamstring stretch 3 way :30x each angle :30x2 ea angle 30"x2 ea angle      Prone knee flexion stretch strap PAIN        SAQ  3x15 MRE 3x15      LAQ  3x15 MRE  3x15      U/l heel raise  2x10 v/c to maintain terminal knee extension       Slant board stretch   3x30"       Ther Activity       Step ups         Lateral step downs  6"  4" 2x10       STS         U/l LP in plantar flexion  70/ 10x  90/ 3x10  90/3x10      Forward step down  4" 10x   6" 10x   8" 10x  6" 2x10      Prone knee flexion AROM  2x10                 Modalities

## 2022-09-02 ENCOUNTER — PREP FOR PROCEDURE (OUTPATIENT)
Dept: OBGYN CLINIC | Facility: CLINIC | Age: 57
End: 2022-09-02

## 2022-09-02 ENCOUNTER — OFFICE VISIT (OUTPATIENT)
Dept: OBGYN CLINIC | Facility: CLINIC | Age: 57
End: 2022-09-02
Payer: OTHER MISCELLANEOUS

## 2022-09-02 VITALS
SYSTOLIC BLOOD PRESSURE: 130 MMHG | DIASTOLIC BLOOD PRESSURE: 80 MMHG | HEIGHT: 72 IN | BODY MASS INDEX: 31.15 KG/M2 | WEIGHT: 230 LBS

## 2022-09-02 DIAGNOSIS — S83.232A COMPLEX TEAR OF MEDIAL MENISCUS OF LEFT KNEE AS CURRENT INJURY, INITIAL ENCOUNTER: Primary | ICD-10-CM

## 2022-09-02 DIAGNOSIS — Z01.818 PRE-OP TESTING: ICD-10-CM

## 2022-09-02 PROCEDURE — 99214 OFFICE O/P EST MOD 30 MIN: CPT | Performed by: ORTHOPAEDIC SURGERY

## 2022-09-02 RX ORDER — ACETAMINOPHEN 325 MG/1
650 TABLET ORAL EVERY 6 HOURS PRN
Status: CANCELLED | OUTPATIENT
Start: 2022-09-02

## 2022-09-02 RX ORDER — TRAMADOL HYDROCHLORIDE 50 MG/1
50 TABLET ORAL EVERY 6 HOURS PRN
Status: CANCELLED | OUTPATIENT
Start: 2022-09-02

## 2022-09-02 NOTE — H&P (VIEW-ONLY)
Ortho Sports Medicine Knee New Patient Visit     Assesment:   59 y o  male left knee medial meniscus posterior root tear    Plan:    Conservative treatment:    Ice to knee for 20 minutes at least 1-2 times daily  OTC NSAIDS prn for pain  Post-op PT written  Crutches given  Will hold on TROM until day of surgery if root repair is performed     Imaging: All imaging from today was reviewed by myself and explained to the patient  Injection:    No Injection planned at this time  Surgery: All of the risks and benefits of operative treatment were explained to the patient, as well as the risks and benefits of any alternative treatment options, including nonoperative care  The risks of surgical treatement include, but are not limited to, infection, bleeding, blood clot, neurovascular damage, need for further surgery, continued pain, cardiovascular risk, and anesthesia risk  The patient understood this and elects to proceed forward with surgical intervention  We will proceed forward with surgical arthroscopy of the knee with menisectomy vs root repair of medial and possible lateral meniscus       Follow up:    Return for 1 week post-op   Chief Complaint   Patient presents with    Left Knee - Pain       History of Present Illness: The patient is a 59 y o  male 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, referred to me by Dr Velma Thomas, seen in clinic for evaluation of left knee pain  Pain is located medial   The patient rates the pain as a 4/10  The pain has been present for roughly 1 month  The patient sustained an injury at work when he jumped from a truck  The mechanism of injury was a work injury  The pain is characterized as sharp, stabbing  The pain is present during the day  Pain is improved by rest, NSAIDS and bracing  Pain is aggravated by squatting, weight bearing and standing  Symptoms include popping and locking       The patient has tried rest, ice, NSAIDS, physical therapy and bracing  Knee Surgical History:  None    Past Medical, Social and Family History:  History reviewed  No pertinent past medical history  Past Surgical History:   Procedure Laterality Date    SHOULDER SURGERY Bilateral     rotator cuff repair     No Known Allergies  Current Outpatient Medications on File Prior to Visit   Medication Sig Dispense Refill    carvedilol (COREG) 12 5 mg tablet Take 12 5 mg by mouth 2 (two) times a day with meals      fenofibrate (TRIGLIDE) 160 MG tablet Take 160 mg by mouth daily      pravastatin (PRAVACHOL) 20 mg tablet Take 20 mg by mouth daily       No current facility-administered medications on file prior to visit  Social History     Socioeconomic History    Marital status: /Civil Union     Spouse name: Not on file    Number of children: Not on file    Years of education: Not on file    Highest education level: Not on file   Occupational History    Not on file   Tobacco Use    Smoking status: Never Smoker    Smokeless tobacco: Never Used   Substance and Sexual Activity    Alcohol use: Not on file    Drug use: Not on file    Sexual activity: Not on file   Other Topics Concern    Not on file   Social History Narrative    Not on file     Social Determinants of Health     Financial Resource Strain: Not on file   Food Insecurity: Not on file   Transportation Needs: Not on file   Physical Activity: Not on file   Stress: Not on file   Social Connections: Not on file   Intimate Partner Violence: Not on file   Housing Stability: Not on file         I have reviewed the past medical, surgical, social and family history, medications and allergies as documented in the EMR  Review of systems: ROS is negative other than that noted in the HPI  Constitutional: Negative for fatigue and fever  HENT: Negative for sore throat  Respiratory: Negative for shortness of breath  Cardiovascular: Negative for chest pain     Gastrointestinal: Negative for abdominal pain  Endocrine: Negative for cold intolerance and heat intolerance  Genitourinary: Negative for flank pain  Musculoskeletal: Negative for back pain  Skin: Negative for rash  Allergic/Immunologic: Negative for immunocompromised state  Neurological: Negative for dizziness  Psychiatric/Behavioral: Negative for agitation  Physical Exam:    Blood pressure 130/80, height 6' (1 829 m), weight 104 kg (230 lb)  General/Constitutional: NAD, well developed, well nourished  HENT: Normocephalic, atraumatic  CV: Intact distal pulses, regular rate  Resp: No respiratory distress or labored breathing  Lymphatic: No lymphadenopathy palpated  Neuro: Alert and Oriented x 3, no focal deficits  Psych: Normal mood, normal affect, normal judgement, normal behavior  Skin: Warm, dry, no rashes, no erythema      Knee Exam (focused):              LEFT   ROM:   0-130   Palpation: Effusion minimal     MJL tenderness Positive     LJL tenderness Negative   Meniscus: Angelic Positive    Apley's Compression Positive   Instability: Varus stable     Valgus stable   Special Tests: Lachman Negative     Posterior drawer Negative     Anterior drawer Negative     Pivot shift not tested     Dial not tested   Patella: Palpation no tenderness     Mobility 1/4     Apprehension Negative   Other: Single leg 1/4 squat not tested      LE NV Exam: +2 DP/PT pulses bilaterally  Sensation intact to light touch L2-S1 bilaterally     Bilateral hip ROM demonstrates no pain actively or passively    No calf tenderness to palpation bilaterally    Knee Imaging    X-rays of the left knee were reviewed, which demonstrate mild degenerative changes in the medial compartment  I have reviewed the radiology report and agree with their impression  MRI of the left knee were reviewed, which demonstrate a complex tear of posteromedial meniscus  I have reviewed the radiology report and agree with their impression

## 2022-09-02 NOTE — LETTER
September 2, 2022     Patient: Ese Moore  YOB: 1965  Date of Visit: 9/2/2022      To Whom it May Concern:    Ese Moore is under my professional care  Tobin Wolfe was seen in my office on 9/2/2022  Tobin Wolfe will be undergoing a left knee surgery  Patient will need to be out of work from the day of surgery until his 1 week postop appointment  At his postop appointment we will re-evaluate his work status  If you have any questions or concerns, please don't hesitate to call           Sincerely,          Leena Loco DO        CC: Ese Hannas

## 2022-09-07 ENCOUNTER — APPOINTMENT (OUTPATIENT)
Dept: PHYSICAL THERAPY | Facility: CLINIC | Age: 57
End: 2022-09-07
Payer: OTHER MISCELLANEOUS

## 2022-09-07 ENCOUNTER — LAB (OUTPATIENT)
Dept: LAB | Facility: HOSPITAL | Age: 57
End: 2022-09-07
Payer: COMMERCIAL

## 2022-09-07 DIAGNOSIS — Z01.818 PRE-OP TESTING: ICD-10-CM

## 2022-09-07 DIAGNOSIS — S83.232A COMPLEX TEAR OF MEDIAL MENISCUS OF LEFT KNEE AS CURRENT INJURY, INITIAL ENCOUNTER: ICD-10-CM

## 2022-09-07 LAB
ANION GAP SERPL CALCULATED.3IONS-SCNC: 7 MMOL/L (ref 4–13)
BUN SERPL-MCNC: 13 MG/DL (ref 5–25)
CALCIUM SERPL-MCNC: 9.7 MG/DL (ref 8.3–10.1)
CHLORIDE SERPL-SCNC: 104 MMOL/L (ref 96–108)
CO2 SERPL-SCNC: 29 MMOL/L (ref 21–32)
CREAT SERPL-MCNC: 0.93 MG/DL (ref 0.6–1.3)
ERYTHROCYTE [DISTWIDTH] IN BLOOD BY AUTOMATED COUNT: 12.1 % (ref 11.6–15.1)
GFR SERPL CREATININE-BSD FRML MDRD: 89 ML/MIN/1.73SQ M
GLUCOSE P FAST SERPL-MCNC: 105 MG/DL (ref 65–99)
HCT VFR BLD AUTO: 43 % (ref 36.5–49.3)
HGB BLD-MCNC: 14.6 G/DL (ref 12–17)
MCH RBC QN AUTO: 29.6 PG (ref 26.8–34.3)
MCHC RBC AUTO-ENTMCNC: 34 G/DL (ref 31.4–37.4)
MCV RBC AUTO: 87 FL (ref 82–98)
PLATELET # BLD AUTO: 344 THOUSANDS/UL (ref 149–390)
PMV BLD AUTO: 9.5 FL (ref 8.9–12.7)
POTASSIUM SERPL-SCNC: 4.6 MMOL/L (ref 3.5–5.3)
RBC # BLD AUTO: 4.93 MILLION/UL (ref 3.88–5.62)
SODIUM SERPL-SCNC: 140 MMOL/L (ref 135–147)
WBC # BLD AUTO: 5.58 THOUSAND/UL (ref 4.31–10.16)

## 2022-09-07 PROCEDURE — 36415 COLL VENOUS BLD VENIPUNCTURE: CPT

## 2022-09-07 PROCEDURE — 93005 ELECTROCARDIOGRAM TRACING: CPT

## 2022-09-07 PROCEDURE — 80048 BASIC METABOLIC PNL TOTAL CA: CPT

## 2022-09-07 PROCEDURE — 85027 COMPLETE CBC AUTOMATED: CPT

## 2022-09-08 LAB
ATRIAL RATE: 69 BPM
P AXIS: 24 DEGREES
PR INTERVAL: 152 MS
QRS AXIS: -11 DEGREES
QRSD INTERVAL: 96 MS
QT INTERVAL: 350 MS
QTC INTERVAL: 375 MS
T WAVE AXIS: -7 DEGREES
VENTRICULAR RATE: 69 BPM

## 2022-09-08 PROCEDURE — 93010 ELECTROCARDIOGRAM REPORT: CPT | Performed by: INTERNAL MEDICINE

## 2022-09-12 ENCOUNTER — APPOINTMENT (OUTPATIENT)
Dept: PHYSICAL THERAPY | Facility: CLINIC | Age: 57
End: 2022-09-12
Payer: OTHER MISCELLANEOUS

## 2022-09-12 ENCOUNTER — TELEPHONE (OUTPATIENT)
Dept: OBGYN CLINIC | Facility: HOSPITAL | Age: 57
End: 2022-09-12

## 2022-09-12 NOTE — TELEPHONE ENCOUNTER
Clement called to confirm some information regarding patient's upcoming surgery  Information provided to the best of my ability

## 2022-09-13 NOTE — PRE-PROCEDURE INSTRUCTIONS
Pre-Surgery Instructions:   Medication Instructions    carvedilol (COREG) 12 5 mg tablet Take this medication day of surgery if normally taken in the morning   fenofibrate (TRIGLIDE) 160 MG tablet Normally takes at night   pravastatin (PRAVACHOL) 20 mg tablet Take this medication day of surgery if normally taken in the morning  My Surgical Experience    The following information was developed to assist you to prepare for your operation  What do I need to do before coming to the hospital?   Arrange for a responsible person to drive you to and from the hospital    Arrange care for your children at home  Children are not allowed in the recovery areas of the hospital   Plan to wear clothing that is easy to put on and take off  If you are having shoulder surgery, wear a shirt that buttons or zippers in the front  Bathing  o Shower the evening before and the morning of your surgery with an antibacterial soap  Please refer to the Pre Op Showering Instructions for Surgery Patients Sheet   o Remove nail polish and all body piercing jewelry  o Do not shave any body part for at least 24 hours before surgery-this includes face, arms, legs and upper body  Food  o Nothing to eat or drink after midnight the night before your surgery  This includes candy and chewing gum  o Exception: If your surgery is after 12:00pm (noon), you may have clear liquids such as 7-Up®, ginger ale, apple or cranberry juice, Jell-O®, water, or clear broth until 8:00 am  o Do not drink milk or juice with pulp on the morning before surgery  o Do not drink alcohol 24 hours before surgery  Medicine  o Follow instructions you received from your surgeon about which medicines you may take on the day of surgery  o If instructed to take medicine on the morning of surgery, take pills with just a small sip of water   Call your prescribing doctor for specific infroamtion on what to do if you take insulin    What should I bring to the hospital?    Bring:  Henrique Wildorado or a walker, if you have them, for foot or knee surgery   A list of the daily medicines, vitamins, minerals, herbals and nutritional supplements you take  Include the dosages of medicines and the time you take them each day   Glasses, dentures or hearing aids   Minimal clothing; you will be wearing hospital sleepwear   Photo ID; required to verify your identity   If you have a Living Will or Power of , bring a copy of the documents   If you have an ostomy, bring an extra pouch and any supplies you use    Do not bring   Medicines or inhalers   Money, valuables or jewelry    What other information should I know about the day of surgery?  Notify your surgeons if you develop a cold, sore throat, cough, fever, rash or any other illness   Report to the Ambulatory Surgical/Same Day Surgery Unit   You will be instructed to stop at Registration only if you have not been pre-registered   Inform your  fi they do not stay that they will be asked by the staff to leave a phone number where they can be reached   Be available to be reached before surgery  In the event the operating room schedule changes, you may be asked to come in earlier or later than expected    *It is important to tell your doctor and others involved in your health care if you are taking or have been taking any non-prescription drugs, vitamins, minerals, herbals or other nutritional supplements   Any of these may interact with some food or medicines and cause a reaction

## 2022-09-14 ENCOUNTER — ANESTHESIA (OUTPATIENT)
Dept: PERIOP | Facility: HOSPITAL | Age: 57
End: 2022-09-14
Payer: OTHER MISCELLANEOUS

## 2022-09-14 ENCOUNTER — HOSPITAL ENCOUNTER (OUTPATIENT)
Facility: HOSPITAL | Age: 57
Setting detail: OUTPATIENT SURGERY
Discharge: HOME | End: 2022-09-14
Attending: ORTHOPAEDIC SURGERY | Admitting: ORTHOPAEDIC SURGERY
Payer: OTHER MISCELLANEOUS

## 2022-09-14 ENCOUNTER — APPOINTMENT (OUTPATIENT)
Dept: PHYSICAL THERAPY | Facility: CLINIC | Age: 57
End: 2022-09-14
Payer: OTHER MISCELLANEOUS

## 2022-09-14 ENCOUNTER — ANESTHESIA EVENT (OUTPATIENT)
Dept: PERIOP | Facility: HOSPITAL | Age: 57
End: 2022-09-14
Payer: OTHER MISCELLANEOUS

## 2022-09-14 VITALS
OXYGEN SATURATION: 94 % | DIASTOLIC BLOOD PRESSURE: 76 MMHG | BODY MASS INDEX: 30.8 KG/M2 | WEIGHT: 227.4 LBS | HEART RATE: 64 BPM | RESPIRATION RATE: 18 BRPM | TEMPERATURE: 98 F | HEIGHT: 72 IN | SYSTOLIC BLOOD PRESSURE: 137 MMHG

## 2022-09-14 DIAGNOSIS — Z98.890 S/P ARTHROSCOPIC SURGERY OF LEFT KNEE: Primary | ICD-10-CM

## 2022-09-14 DIAGNOSIS — S83.232A COMPLEX TEAR OF MEDIAL MENISCUS OF LEFT KNEE AS CURRENT INJURY, INITIAL ENCOUNTER: ICD-10-CM

## 2022-09-14 PROCEDURE — 29881 ARTHRS KNE SRG MNISECTMY M/L: CPT

## 2022-09-14 PROCEDURE — 29881 ARTHRS KNE SRG MNISECTMY M/L: CPT | Performed by: ORTHOPAEDIC SURGERY

## 2022-09-14 RX ORDER — TRAMADOL HYDROCHLORIDE 50 MG/1
50 TABLET ORAL EVERY 6 HOURS PRN
Status: DISCONTINUED | OUTPATIENT
Start: 2022-09-14 | End: 2022-09-14 | Stop reason: HOSPADM

## 2022-09-14 RX ORDER — CEFAZOLIN SODIUM 2 G/50ML
2000 SOLUTION INTRAVENOUS ONCE
Status: COMPLETED | OUTPATIENT
Start: 2022-09-14 | End: 2022-09-14

## 2022-09-14 RX ORDER — FENTANYL CITRATE 50 UG/ML
INJECTION, SOLUTION INTRAMUSCULAR; INTRAVENOUS AS NEEDED
Status: DISCONTINUED | OUTPATIENT
Start: 2022-09-14 | End: 2022-09-14

## 2022-09-14 RX ORDER — ONDANSETRON 2 MG/ML
INJECTION INTRAMUSCULAR; INTRAVENOUS AS NEEDED
Status: DISCONTINUED | OUTPATIENT
Start: 2022-09-14 | End: 2022-09-14

## 2022-09-14 RX ORDER — ASPIRIN 325 MG
325 TABLET, DELAYED RELEASE (ENTERIC COATED) ORAL DAILY
Qty: 30 TABLET | Refills: 0 | Status: SHIPPED | OUTPATIENT
Start: 2022-09-14

## 2022-09-14 RX ORDER — KETOROLAC TROMETHAMINE 30 MG/ML
INJECTION, SOLUTION INTRAMUSCULAR; INTRAVENOUS AS NEEDED
Status: DISCONTINUED | OUTPATIENT
Start: 2022-09-14 | End: 2022-09-14

## 2022-09-14 RX ORDER — FENTANYL CITRATE/PF 50 MCG/ML
25 SYRINGE (ML) INJECTION
Status: DISCONTINUED | OUTPATIENT
Start: 2022-09-14 | End: 2022-09-14 | Stop reason: HOSPADM

## 2022-09-14 RX ORDER — ONDANSETRON 2 MG/ML
4 INJECTION INTRAMUSCULAR; INTRAVENOUS ONCE AS NEEDED
Status: DISCONTINUED | OUTPATIENT
Start: 2022-09-14 | End: 2022-09-14 | Stop reason: HOSPADM

## 2022-09-14 RX ORDER — HYDROMORPHONE HCL/PF 1 MG/ML
0.5 SYRINGE (ML) INJECTION
Status: DISCONTINUED | OUTPATIENT
Start: 2022-09-14 | End: 2022-09-14 | Stop reason: HOSPADM

## 2022-09-14 RX ORDER — DEXAMETHASONE SODIUM PHOSPHATE 10 MG/ML
INJECTION, SOLUTION INTRAMUSCULAR; INTRAVENOUS AS NEEDED
Status: DISCONTINUED | OUTPATIENT
Start: 2022-09-14 | End: 2022-09-14

## 2022-09-14 RX ORDER — SODIUM CHLORIDE, SODIUM LACTATE, POTASSIUM CHLORIDE, CALCIUM CHLORIDE 600; 310; 30; 20 MG/100ML; MG/100ML; MG/100ML; MG/100ML
INJECTION, SOLUTION INTRAVENOUS CONTINUOUS PRN
Status: DISCONTINUED | OUTPATIENT
Start: 2022-09-14 | End: 2022-09-14

## 2022-09-14 RX ORDER — MIDAZOLAM HYDROCHLORIDE 2 MG/2ML
INJECTION, SOLUTION INTRAMUSCULAR; INTRAVENOUS AS NEEDED
Status: DISCONTINUED | OUTPATIENT
Start: 2022-09-14 | End: 2022-09-14

## 2022-09-14 RX ORDER — DEXMEDETOMIDINE HYDROCHLORIDE 100 UG/ML
INJECTION, SOLUTION INTRAVENOUS AS NEEDED
Status: DISCONTINUED | OUTPATIENT
Start: 2022-09-14 | End: 2022-09-14

## 2022-09-14 RX ORDER — LIDOCAINE HYDROCHLORIDE 10 MG/ML
INJECTION, SOLUTION EPIDURAL; INFILTRATION; INTRACAUDAL; PERINEURAL AS NEEDED
Status: DISCONTINUED | OUTPATIENT
Start: 2022-09-14 | End: 2022-09-14

## 2022-09-14 RX ORDER — PROPOFOL 10 MG/ML
INJECTION, EMULSION INTRAVENOUS AS NEEDED
Status: DISCONTINUED | OUTPATIENT
Start: 2022-09-14 | End: 2022-09-14

## 2022-09-14 RX ORDER — OXYCODONE HYDROCHLORIDE 5 MG/1
5 TABLET ORAL EVERY 4 HOURS PRN
Qty: 15 TABLET | Refills: 0 | Status: SHIPPED | OUTPATIENT
Start: 2022-09-14

## 2022-09-14 RX ORDER — ACETAMINOPHEN 325 MG/1
650 TABLET ORAL EVERY 6 HOURS PRN
Status: DISCONTINUED | OUTPATIENT
Start: 2022-09-14 | End: 2022-09-14 | Stop reason: HOSPADM

## 2022-09-14 RX ADMIN — CEFAZOLIN SODIUM 2000 MG: 2 SOLUTION INTRAVENOUS at 09:20

## 2022-09-14 RX ADMIN — LIDOCAINE HYDROCHLORIDE 50 MG: 10 INJECTION, SOLUTION EPIDURAL; INFILTRATION; INTRACAUDAL at 09:15

## 2022-09-14 RX ADMIN — DEXMEDETOMIDINE 10 MCG: 100 INJECTION, SOLUTION, CONCENTRATE INTRAVENOUS at 09:58

## 2022-09-14 RX ADMIN — FENTANYL CITRATE 50 MCG: 50 INJECTION, SOLUTION INTRAMUSCULAR; INTRAVENOUS at 09:17

## 2022-09-14 RX ADMIN — PROPOFOL 200 MG: 10 INJECTION, EMULSION INTRAVENOUS at 09:15

## 2022-09-14 RX ADMIN — SODIUM CHLORIDE, SODIUM LACTATE, POTASSIUM CHLORIDE, AND CALCIUM CHLORIDE: .6; .31; .03; .02 INJECTION, SOLUTION INTRAVENOUS at 08:45

## 2022-09-14 RX ADMIN — DEXAMETHASONE SODIUM PHOSPHATE 10 MG: 10 INJECTION, SOLUTION INTRAMUSCULAR; INTRAVENOUS at 09:15

## 2022-09-14 RX ADMIN — FENTANYL CITRATE 50 MCG: 50 INJECTION, SOLUTION INTRAMUSCULAR; INTRAVENOUS at 09:45

## 2022-09-14 RX ADMIN — ONDANSETRON 4 MG: 2 INJECTION INTRAMUSCULAR; INTRAVENOUS at 09:58

## 2022-09-14 RX ADMIN — MIDAZOLAM 2 MG: 1 INJECTION INTRAMUSCULAR; INTRAVENOUS at 09:07

## 2022-09-14 RX ADMIN — KETOROLAC TROMETHAMINE 30 MG: 30 INJECTION, SOLUTION INTRAMUSCULAR; INTRAVENOUS at 09:58

## 2022-09-14 NOTE — INTERVAL H&P NOTE
H&P reviewed  After examining the patient I find no changes in the patients condition since the H&P had been written      Vitals:    09/14/22 0758   BP: 169/87   Pulse: 70   Resp: 18   Temp: (!) 96 9 °F (36 1 °C)   SpO2: 97%

## 2022-09-14 NOTE — OP NOTE
OPERATIVE REPORT  PATIENT NAME: Anila Wallace    :  1965  MRN: [id number]  Pt Location: [facility] OR ROOM 01    SURGERY DATE: 2022    Surgeon(s) and Role:     * Luz Elena Menendez, DO - Primary     * [employer], [place] - [address] [place] [address]C - Assisting    Preop Diagnosis:  Complex tear of medial meniscus of left knee as current injury, initial encounter [S83 232A]    Post-Op Diagnosis Codes:     * Complex tear of medial meniscus of left knee as current injury, initial encounter [S83 232A]    Procedure(s) (LRB):  medial meniscectomy (Left)    Specimen(s):  * No specimens in log *    Estimated Blood Loss:   Minimal    Drains:  * No LDAs found *    Anesthesia Type:   General    Operative Indications:  Complex tear of medial meniscus of left knee as current injury, initial encounter [V79 586S]    Complications:   None    Procedure and Technique:      Pre-operative Diagnosis: Left knee medial meniscus complex tear     Post-operative Diagnosis: Left knee medial meniscus complex tear     Operation:  Surgical arthroscopy of the Left knee with partial medial meniscectomy, CPT 93886        Anesthesia:  general     Tourniquet Time:  30 min      Blood Loss:  Minimal      Indications: Mr Aruna Garcia is a 59 y o  male with a   An MRI was obtained a revealed a tear of the Left medial meniscus   Due to the patient's MRI findings, active lifestyle, and lack of improvement with a conservative approach, it was recommended that they proceed forward with arthroscopic surgical management of this problem  We reviewed risks and benefits of surgery and a decision was made to proceed with surgery to address the torn medial meniscus             Findings:       Examination under anesthesia of the operative Left knee revealed a range of motion of 0-130 degrees  Posterior drawer testing was negative  Lachman testing was negative   Pivot shift testing was negative,  Collateral ligament stability testing revealed no laxity with valgus or varus stresses  With respect to posterolateral corner testing, dial testing at 30 and at 90 degrees was symmetric to the contralateral knee  Arthroscopic evaluation of the knee revealed the following:     Medial meniscus: There was a complex, multidirectional tear of the medial meniscus      Medial femoral condyle:Grade III chondral defects  Medial tibial plateau: Grade  0 chondral defects  Anterior cruciate ligament: Normal appearance  Posterior cruciate ligament: Normal appearance  Lateral meniscus: No tears  Lateral femoral condyle: Grade 0 chondral defects  Lateral tibial plateau: Grade0 chondral defects     Medial and lateral gutters: No loose bodies  Patella: Grade IV chondral defects  Trochlea: Grade 0 chondral defects  Medial plica: No significant plica was present             Procedure:  In the pre-operative holding area, the patient identified the correct operative extremity and I marked that extremity with my initials, using a permanent marker  The patient was brought to the operating room and positioned supine  Following satisfactory induction of anesthesia, the Left knee was prepped and draped in the usual sterile fashion for surgical arthroscopy of the Left knee  Before any surgical instrumentation was passed to me by the surgical technician, a formalized time-out occurred, which involves the surgeon, circulating nurse, and anesthesia staff all verifying the correct operative extremity  My initials were visible on the prepped and draped operative field  The anatomic landmarks of the anteromedial and anterolateral portals were marked  The anterolateral portal was established with a scalpel  The arthroscope was introduced through this portal  Under direct visualization, the anteromedial portal was established with a localizing needle followed by a scalpel   A probe was then introduced into the anteromedial portal  A systematic diagnostic arthroscopy evaluated the following:  medial compartment, notch, lateral compartment, patellofemoral compartment, medial gutter, and lateral gutter  A complex medial meniscus tear was noted in the posterior root and horn, with degenerative tearing components  Due to the chondral changes of the medial femoral condyle and grade 4 changes of the patella was felt a root repair of the meniscus would not be beneficial instead meniscectomy was performed  This tear was associated with meniscal fragments that were grossly unstable to probing  The medial meniscus tear was debrided to a stable base, using the arthroscopic biters and motorized shaver  There was no additional pathology  All particulate debris was removed  The knee was copiously rinsed and then drained  The portals were closed with an interrupted 4-0 Vicryl absorbable suture  The skin was cleansed with sterile saline and dried before Steri-Strips were applied  Finally, a sterile dressing was secured by Webril and an Ace wrap  I was present for all critical portions of the operation, which included the entire diagnostic arthroscopy and partial medial meniscectomy, and immediately available to return  The patient tolerated the procedure without complication and was transported to the recovery room in stable condition         I was present for the entire procedure, A qualified resident physician was not available and A physician assistant was required during the procedure for retraction tissue handling,dissection and suturing    Patient Disposition:  PACU         SIGNATURE: [unfilled]  DATE: September 14, 2022  TIME: 10:21 AM

## 2022-09-14 NOTE — ANESTHESIA POSTPROCEDURE EVALUATION
Post-Op Assessment Note    CV Status:  Stable  Pain Score: 0    Pain management: adequate     Mental Status:  Sleepy   Hydration Status:  Stable   PONV Controlled:  None   Airway Patency:  Patent      Post Op Vitals Reviewed: Yes      Staff: CRNA         No complications documented      BP   124/64   Temp     Pulse  68   Resp   16   SpO2   97

## 2022-09-14 NOTE — DISCHARGE INSTRUCTIONS
POSTOPERATIVE INSTRUCTIONS following KNEE SURGERY    MEDICATIONS:  Resume all home medications unless otherwise instructed by your surgeon  Pain Medication:  Oxycodone 5 mg, 1 tablet every 4 hours as needed  If you were given a regional anesthetic (nerve block), please begin taking the pain medication as soon as you get home, even if you have minimal or no pain  DO NOT WAIT FOR THE NERVE BLOCK TO WEAR OFF  Possible side effects include nausea, constipation, and urinary retention  If you experience these side effects, please call our office for assistance  Pain med refills are authorized only during office hours (8am-4pm, Mon-Fri)  Anti-Inflammatory:  Ibuprofen 600 mg, 1 tablet every 8 hours for 4 weeks and Tylenol 325 mg, 1-2 tablets every 6 hours for 4 weeks  Take with food  Stop if you experience nausea, reflux, or stomach pain  Nausea Medication:  None  Fill prescription ONLY if you expericnce severe nausea  Blood Clot Prevention:  Aspirin 325 mg, 1 tablet daily for 3 weeks  Pump your foot up and down 20 times per hour while you are less mobile  WOUND CARE:  Keep the dressing clean and dry  Light drainage may occur the first 2 days postop  You may remove the dressings and get the incision wet in the shower 72 hours after surgery  DO NOT remove steri-strips or sutures  DO NOT immerse the incision under water  Carefully pat the incision dry  If there is wound drainage, re-apply a fresh dry gauze dressing  Please call our office ([phone]) if you experience either of the following:  Sudden increase in swelling, redness, or warmth at the surgical site  Excessive incisional drainage that persists beyond the 3rd day after surgery  Oral temperature greater than 101 degrees, not relieved with Tylenol  Shortness of breath, chest pain, nausea, or any other concerning symptoms    SWELLING CONTROL:  Cold Therapy:   The cold therapy device may be used either continuously or only as needed, according to your preference  Do not let the pad directly touch your skin  Alternatively, apply ice (20 min on, 20 min off) as often as you feel is necessary  Elevation:  Elevate the entire leg above heart level  Place pillows under your ankle to keep your knee straight  Compression:  Apply ACE wraps or a thigh-length compression stocking as needed  RANGE OF MOTION:  You are allowed FULL RANGE OF MOTION as tolerated  IMMOBILIZATION:  None  You are allowed full range of motion as tolerated  ACTIVITY:   BEAR FULL WEIGHT AS TOLERATED on the operative leg  Use crutches to assist only as needed  Using Crutches on Stairs:  Going up, lead with your "good" (nonoperative) leg  Going down, lead with your "bad" (operative) leg  Use a hand rail when available  Knee Extension:  Place a rolled towel or pillow under your ankle for 20-30 minutes 3-5 times per day  This will help to maintain full knee extension  Quad Sets:  Sit or lie with your knee straight  Tighten your quadriceps (front thigh) muscle  Hold for 3 seconds, then relax  Repeat 20 times per hour while awake  PHYSICAL THERAPY:  Begin therapy 3 TO 5 DAYS AFTER SURGERY  You were given a prescription for therapy at your preoperative office visit  If you do not have physical therapy scheduled yet, please call our office for assistance  FOLLOW-UP APPOINTMENT:  7-10 days after surgery with:    [facility]  [address] [place] [address], [facility], [facility]  [phone] ([facility])  [phone] (After-Hours)

## 2022-09-14 NOTE — ANESTHESIA PREPROCEDURE EVALUATION
Procedure:  meniscectomy vs root repair of medial & possible lateral meniscus (Left Knee)    Relevant Problems   No relevant active problems   HTN, HLD         Physical Exam    Airway    Mallampati score: III  TM Distance: >3 FB  Neck ROM: full     Dental       Cardiovascular  Rate: normal,     Pulmonary  Pulmonary exam normal     Other Findings        Anesthesia Plan  ASA Score- 2     Anesthesia Type- general with ASA Monitors  Additional Monitors:   Airway Plan: LMA  Plan Factors-    Chart reviewed  EKG reviewed  Existing labs reviewed  Patient summary reviewed  Obstructive sleep apnea risk education given perioperatively  Induction- intravenous  Postoperative Plan-     Informed Consent- Anesthetic plan and risks discussed with patient  I personally reviewed this patient with the CRNA  Discussed and agreed on the Anesthesia Plan with the CRNA  Faisal Boyd

## 2022-09-19 ENCOUNTER — EVALUATION (OUTPATIENT)
Dept: PHYSICAL THERAPY | Facility: CLINIC | Age: 57
End: 2022-09-19
Payer: OTHER MISCELLANEOUS

## 2022-09-19 DIAGNOSIS — S83.105A ACUTE TRAUMATIC INTERNAL DERANGEMENT OF LEFT KNEE, INITIAL ENCOUNTER: ICD-10-CM

## 2022-09-19 DIAGNOSIS — M25.562 ACUTE PAIN OF LEFT KNEE: ICD-10-CM

## 2022-09-19 DIAGNOSIS — M25.562 KNEE MENISCUS PAIN, LEFT: ICD-10-CM

## 2022-09-19 DIAGNOSIS — M23.92 LOCKING OF LEFT KNEE: ICD-10-CM

## 2022-09-19 DIAGNOSIS — M23.92 LOCKING KNEE, LEFT: Primary | ICD-10-CM

## 2022-09-19 PROCEDURE — 97164 PT RE-EVAL EST PLAN CARE: CPT | Performed by: PHYSICAL THERAPIST

## 2022-09-19 PROCEDURE — 97110 THERAPEUTIC EXERCISES: CPT | Performed by: PHYSICAL THERAPIST

## 2022-09-19 PROCEDURE — 97140 MANUAL THERAPY 1/> REGIONS: CPT | Performed by: PHYSICAL THERAPIST

## 2022-09-19 NOTE — PROGRESS NOTES
PT Re-Evaluation     Today's date: 2022  Patient name: Jose Self  : 1965  MRN: [id number]  Referring provider: Mary Ryder PA-C  Dx:   Encounter Diagnosis     ICD-10-CM    1  Acute pain of left knee  M25 562    2  Locking of left knee  M23 92    3  Knee meniscus pain, left  M25 562    4  Acute traumatic internal derangement of left knee, initial encounter  S83 105A        Assessment  Assessment details: Jose Self is a 59 y o  male who presents with pain, decreased strength, decreased ROM, decreased joint mobility, joint effusion, ambulatory dysfunction and balance dysfunction s/p L knee menisectomy performed on 22  Due to these impairments, patient has difficulty performing ADL's, recreational activities, work-related activities, ambulation, stair negotiation, lifting/carrying, transfers  Patient's clinical presentation is consistent with their referring diagnosis of Acute pain of left knee  (primary encounter diagnosis), Locking of left knee, Knee meniscus pain, left, Acute traumatic internal derangement of left knee, initial encounter  Patient has been educated in home exercise program and plan of care   Patient would benefit from skilled physical therapy services to address their aforementioned functional limitations and progress towards prior level of function and independence with home exercise program      Impairments: abnormal gait, abnormal or restricted ROM, activity intolerance, impaired physical strength, lacks appropriate home exercise program, pain with function, weight-bearing intolerance, poor posture  and poor body mechanics  Functional limitations: walk/stand, STS, stair negotiation, squat, lunge/kneel, moving desks/safes for work   Prognosis: good  Prognosis details: + factors: high motivation levels  - factors: MRI + for meniscus     Goals  Short Term Goals to be accomplished by 22:  STG1: Pt will be I with HEP  STG2: Pt will be able to ascend steps without use of handrail  STG3: Pt will be able to perform STS without use of UE  STG4: Pt will amb community distance without gait deviates due to pain  Long Term Goals to be accomplished by 11/14/22:   LTG1: Pt will be able to step up/down off trailer at work for outdoor work duties  LTG2: Pt will be able to move school furniture, safe, desks  LTG3: Pt will be able to squat to lift objects at work repeatedly without knee pain  LTG4: Pt will return to full work day at full duty capacity as per PLOF  Plan  Plan details: HEP development, stretching, strengthening, A/AA/PROM, joint mobilizations, posture education, STM/MI as needed to reduce muscle tension, muscle reeducation, PLOC discussed and agreed upon with patient  Patient would benefit from: PT eval and skilled physical therapy  Planned modality interventions: cryotherapy, thermotherapy: hydrocollator packs and unattended electrical stimulation  Planned therapy interventions: manual therapy, neuromuscular re-education, self care, therapeutic activities, therapeutic exercise, home exercise program, patient education, joint mobilization, balance, strengthening, stretching, therapeutic training and flexibility  Frequency: 2x week  Duration in weeks: 12  Plan of Care beginning date: 8/22/2022  Plan of Care expiration date: 11/14/2022  Treatment plan discussed with: patient    Subjective Evaluation    History of Present Illness  Mechanism of injury: Pt is a 60 y/o male who presents to PT through [facility]  He works as a  for [employer]  DOI: 8/9/22  LAITH: He stated that he "tweaked" his knee when he was getting up onto a trailer at work  He reported the injury to his employer and he was sent to the ER at [facility] where x-ray showed no fracture  MRI was last Thursday,  And he has an appointment with ortho this Friday where he will review the results of his MRI and discuss a plan of care     He has already returned to work, however he is trying to stay off his feet as much as possible  He is not performing lifting activities  He is doing work duties  He had surgery on 22 and he was cleared by his surgeon to begin PT  Pain  Current pain ratin  At best pain ratin  At worst pain ratin  Location: L knee   Quality: throbbing, tight, pressure and discomfort  Relieving factors: ice and relaxation  Aggravating factors: standing, walking, stair climbing, sitting and lifting    Treatments  Current treatment: physical therapy  Patient Goals  Patient goals for therapy: increased motion, improved balance, decreased pain, increased strength, independence with ADLs/IADLs, return to sport/leisure activities and return to work  Patient goal: STS, stair negotiation, squat, lunge, kneel, work duties, lifting/carrying     Objective     Observations   Left Knee   Positive for edema  Additional Observation Details  Brace during standing/walking    Tenderness   Left Knee   Tenderness in the ITB, MCL (distal), MCL (proximal), medial joint line and pes anserinus  Active Range of Motion   Left Knee   Flexion: 122 degrees with pain  Extension: 5 degrees with pain    Right Knee   Flexion: 145 degrees   Extension: 0 degrees     Mobility   Patellar Mobility:   Left Knee   Hypomobile: left medial, left lateral, left superior and left inferior    Strength/Myotome Testing     Left Knee   Flexion: 4-/5  Extension: 3-/5 (5 degree quadriceps lag)    Additional Strength Details  U/l heel raise: NOT TESTED DUE TO PAIN  R: reps  L: reps     Tests     Left Knee   Negative valgus stress test at 30 degrees and varus stress test at 30 degrees       Additional Tests Details  SLS NOT TESTED DUE TO PAIN  R: sec  L: sec         Precautions:   History of R knee injury    Manuals         STM  JZ        PROM stretch JZ        Pat mobs  Hold                  Neuro Re-Ed  Ther Ex 9/19        SLR 2x10         S/l hip abd 2x10        Seated hamstring stretch  :30x3        LAQ 2x10         Stand knee flexion stretch  :30x2                                    Ther Activity 9/19        Step ups  Hold       Lateral step downs  Hold       STS  Hold       U/l LP in plantar flexion                                    Modalities 9/19

## 2022-09-21 ENCOUNTER — OFFICE VISIT (OUTPATIENT)
Dept: PHYSICAL THERAPY | Facility: CLINIC | Age: 57
End: 2022-09-21
Payer: OTHER MISCELLANEOUS

## 2022-09-21 DIAGNOSIS — M25.562 KNEE MENISCUS PAIN, LEFT: ICD-10-CM

## 2022-09-21 DIAGNOSIS — M23.92 LOCKING OF LEFT KNEE: ICD-10-CM

## 2022-09-21 DIAGNOSIS — M25.562 ACUTE PAIN OF LEFT KNEE: ICD-10-CM

## 2022-09-21 DIAGNOSIS — S83.105A ACUTE TRAUMATIC INTERNAL DERANGEMENT OF LEFT KNEE, INITIAL ENCOUNTER: ICD-10-CM

## 2022-09-21 DIAGNOSIS — M23.92 LOCKING KNEE, LEFT: Primary | ICD-10-CM

## 2022-09-21 PROCEDURE — 97140 MANUAL THERAPY 1/> REGIONS: CPT | Performed by: PHYSICAL THERAPIST

## 2022-09-21 PROCEDURE — 97110 THERAPEUTIC EXERCISES: CPT | Performed by: PHYSICAL THERAPIST

## 2022-09-21 NOTE — PROGRESS NOTES
Daily Note     Today's date: 2022  Patient name: Ese Moore  : 1965  MRN: [id number]  Referring provider: PACO Fuentes*  Dx:   Encounter Diagnosis     ICD-10-CM    1  Locking knee, left  M23 92    2  Acute traumatic internal derangement of left knee, initial encounter  S83 105A    3  Acute pain of left knee  M25 562    4  Knee meniscus pain, left  M25 562    5  Locking of left knee  M23 92           Subjective: Pt noted that he felt significant soreness following the last visit  Noted that "I think I over did it "     Objective: See treatment diary below    Assessment: Tolerated treatment well  Patient demonstrated fatigue post treatment, exhibited good technique with therapeutic exercises and would benefit from continued PT  Plan: Continue per plan of care        Precautions:   History of R knee injury     Manuals        STM  JZ JZ       PROM stretch JZ JZ       Pat mobs  Hold  Hold                 Neuro Re-Ed                                                                       Ther Ex        SLR 2x10  2x10        S/l hip abd 2x10        Seated hamstring stretch  :30x3 3 way :30x2 ea       LAQ 2x10         Stand knee flexion stretch  :30x2  Prone :30x3        Quad set  :10x10       Heel raise  3x10                Ther Activity        Step ups  Hold       Lateral step downs  Hold       STS  Hold       U/l LP in plantar flexion                                    Modalities

## 2022-09-26 ENCOUNTER — OFFICE VISIT (OUTPATIENT)
Dept: PHYSICAL THERAPY | Facility: CLINIC | Age: 57
End: 2022-09-26
Payer: OTHER MISCELLANEOUS

## 2022-09-26 DIAGNOSIS — M25.562 ACUTE PAIN OF LEFT KNEE: ICD-10-CM

## 2022-09-26 DIAGNOSIS — M25.562 KNEE MENISCUS PAIN, LEFT: ICD-10-CM

## 2022-09-26 DIAGNOSIS — S83.105A ACUTE TRAUMATIC INTERNAL DERANGEMENT OF LEFT KNEE, INITIAL ENCOUNTER: ICD-10-CM

## 2022-09-26 DIAGNOSIS — M23.92 LOCKING OF LEFT KNEE: ICD-10-CM

## 2022-09-26 DIAGNOSIS — M23.92 LOCKING KNEE, LEFT: Primary | ICD-10-CM

## 2022-09-26 PROCEDURE — 97140 MANUAL THERAPY 1/> REGIONS: CPT | Performed by: PHYSICAL THERAPIST

## 2022-09-26 PROCEDURE — 97110 THERAPEUTIC EXERCISES: CPT | Performed by: PHYSICAL THERAPIST

## 2022-09-26 NOTE — PROGRESS NOTES
Daily Note     Today's date: 2022  Patient name: Osvaldo Bledsoe  : 1965  MRN: [id number]  Referring provider: PACO Salvador*  Dx:   Encounter Diagnosis     ICD-10-CM    1  Locking knee, left  M23 92    2  Acute traumatic internal derangement of left knee, initial encounter  S83 105A    3  Knee meniscus pain, left  M25 562    4  Acute pain of left knee  M25 562    5  Locking of left knee  M23 92           Subjective: Pt noted that he was feeling stiff and swollen this morning when he woke up  However he did put ice on it, which reduced the swelling  Objective: See treatment diary below    Assessment: Tolerated treatment well  Patient demonstrated fatigue post treatment, exhibited good technique with therapeutic exercises and would benefit from continued PT  Plan: Continue per plan of care        Precautions:   History of R knee injury     Manuals       STM  JZ JZ JZ      PROM stretch JZ JZ JZ      Pat mobs  Hold  Hold                 Neuro Re-Ed                                                                      Ther Ex       SLR 2x10  2x10  3x15 ea      S/l hip abd 2x10  3x10       S/l hip add   3x10       Seated hamstring stretch  :30x3 3 way :30x2 ea :30x3       LAQ 2x10         Stand knee flexion stretch  :30x2  Prone :30x3        Quad set  :10x10       Heel raise  3x10 3x15                Ther Activity       Step ups  Hold       Lateral step downs  Hold       STS  Hold       U/l LP in plantar flexion         RB    5'                         Modalities  She was on the play ground this morning and she had a 2 minute seizure which is atypical for her.  Usually her seizures only last 30 seconds.  According to school she is awake and alert and pinching.  She is pacheco but unable to communicate how she is feeling because that is her normal.  She seems like she \"looked\" sick before she went to school with a glazed look on her face but otherwise seemed fine.  Mom thinks that she may have a UTI because she seems to be holding her urine but she cannot communicate to let mom know what is going on.  She has been backed for constipation and mom has been given her a suppository to help.  She wears a pull-up at night.  When she goes to the bathroom, she will try to go but nothing will come out.  Mom has been giving her cranberry juice the last couple days to try to help her also.  She is not sleepy after the seizure because if she would be she would go take a nap on her special bean bag chair that they have for her at school.      Mom would like for her to be seen to screen her urine to make sure she doesn't have a UTI and let her know that as long as she is stable neurologically, that can be done in the office here.  Scheduled her with Dr. Barrera at 1:10 with a 12:55 arrival time.  Encouraged mom to cancel the appointment and seek care in ED or urgent care if she is not stable neurologically once she gets to the school to see her with her own eyes.  She verbalized understanding and and thanks.

## 2022-09-28 ENCOUNTER — OFFICE VISIT (OUTPATIENT)
Dept: PHYSICAL THERAPY | Facility: CLINIC | Age: 57
End: 2022-09-28
Payer: OTHER MISCELLANEOUS

## 2022-09-28 DIAGNOSIS — M23.92 LOCKING OF LEFT KNEE: ICD-10-CM

## 2022-09-28 DIAGNOSIS — M25.562 ACUTE PAIN OF LEFT KNEE: ICD-10-CM

## 2022-09-28 DIAGNOSIS — S83.105A ACUTE TRAUMATIC INTERNAL DERANGEMENT OF LEFT KNEE, INITIAL ENCOUNTER: ICD-10-CM

## 2022-09-28 DIAGNOSIS — M25.562 KNEE MENISCUS PAIN, LEFT: ICD-10-CM

## 2022-09-28 DIAGNOSIS — M23.92 LOCKING KNEE, LEFT: Primary | ICD-10-CM

## 2022-09-28 PROCEDURE — 97140 MANUAL THERAPY 1/> REGIONS: CPT | Performed by: PHYSICAL THERAPIST

## 2022-09-28 PROCEDURE — 97110 THERAPEUTIC EXERCISES: CPT | Performed by: PHYSICAL THERAPIST

## 2022-09-28 NOTE — PROGRESS NOTES
Daily Note     Today's date: 2022  Patient name: Anila Wallace  : 1965  MRN: [id number]  Referring provider: PACO Lopez*  Dx:   Encounter Diagnosis     ICD-10-CM    1  Locking knee, left  M23 92    2  Acute traumatic internal derangement of left knee, initial encounter  S83 105A    3  Knee meniscus pain, left  M25 562    4  Acute pain of left knee  M25 562    5  Locking of left knee  M23 92           Subjective: Pt reported that "I felt great after the last visit " Noted that he has his follow up appointment with his physician tomorrow morning  Noted that "I hope to get some more direction on where to go and what to do going forward "    Objective: See treatment diary below    Assessment: Tolerated treatment well  Patient demonstrated fatigue post treatment, exhibited good technique with therapeutic exercises and would benefit from continued PT  Plan: Continue per plan of care        Precautions:   History of R knee injury     Manuals      STM  JZ JZ JZ JZ     PROM stretch JZ JZ JZ JZ     Pat mobs  Hold  Hold                 Neuro Re-Ed                                                                     Ther Ex      SLR 2x10  2x10  3x15 ea 2# 3x10 ea      S/l hip abd 2x10  3x10  3x10 ea     S/l hip add   3x10       Seated hamstring stretch  :30x3 3 way :30x2 ea :30x3  :30x3 ea     LAQ 2x10         Stand knee flexion stretch  :30x2  Prone :30x3        Quad set  :10x10       Heel raise  3x10 3x15  3x20     Stand knee flexion AROM    3x10 ea     Seated quad isometric in extension    MRE :30x3                        Ther Activity      Step ups  Hold       Lateral step downs  Hold       STS  Hold       U/l LP in plantar flexion         RB    5'  5' lvl3                       Modalities

## 2022-09-29 ENCOUNTER — OFFICE VISIT (OUTPATIENT)
Dept: OBGYN CLINIC | Facility: MEDICAL CENTER | Age: 57
End: 2022-09-29

## 2022-09-29 VITALS
HEIGHT: 72 IN | HEART RATE: 76 BPM | SYSTOLIC BLOOD PRESSURE: 151 MMHG | BODY MASS INDEX: 31.56 KG/M2 | DIASTOLIC BLOOD PRESSURE: 87 MMHG | WEIGHT: 233 LBS

## 2022-09-29 DIAGNOSIS — Z98.890 S/P ARTHROSCOPY OF LEFT KNEE: Primary | ICD-10-CM

## 2022-09-29 PROCEDURE — 99024 POSTOP FOLLOW-UP VISIT: CPT | Performed by: ORTHOPAEDIC SURGERY

## 2022-09-29 NOTE — PROGRESS NOTES
Knee Post Operative Visit     Assesment:     Surgical Arthroscopy of the left knee with partial medial meniscectomy with grade III MFC, grade IV patellofemoral overall doing well     Plan:    Post-Operative treatment:    Ice to knee for 20 minutes at least 1-2 times daily  Continue PT for ROM/strengthening to knee, hip and core  OTC NSAIDS prn for pain  See patient back in 3 weeks to clear for work     Imaging:    No imaging was available for review today  Weight bearing:  as tolerated     ROM:  Full    Brace:  No brace needed    DVT Prophylaxis:  Aspirin 325 mg oral twice daily x 2 weeks    Follow up:   3 weeks     Patient was advised that if they have any fevers, chills, chest pain, shortness of breath, redness or drainage from the incision, please let our office know immediately  History of Present Illness: The patient is a 59 y o  male who is being evaluated post operatively 2 weeks status post arthroscopy partial medial meniscectomy 9/14/22  Since the prior visit, He reports improvement  Pain is well controlled  The patient is using ice to control swelling  They have started physical therapy  The patient has been ambulating  crutches  The patient has been ambulating without a brace  The patient denies any fevers, chills, calf pain, chest pain/shortness of breath, redness or drainage from the incision  I have reviewed the past medical, surgical, social and family history, medications and allergies as documented in the EMR  Review of systems: ROS is negative other than that noted in the HPI  Constitutional: Negative for fatigue and fever  Physical Exam:    Blood pressure 151/87, pulse 76, height 6' (1 829 m), weight 106 kg (233 lb)      General/Constitutional: NAD, well developed, well nourished  HENT: Normocephalic, atraumatic  CV: Intact distal pulses, regular rate  Resp: No respiratory distress or labored breathing  Lymphatic: No lymphadenopathy palpated  Neuro: Alert and Oriented x 3, no focal deficits  Psych: Normal mood, normal affect, normal judgement, normal behavior  Skin: Warm, dry, no rashes, no erythema       Knee Exam (focused):                   RIGHT LEFT   ROM:   0-130 0-130   Palpation: Effusion negative mild     MJL tenderness Negative Positive     LJL tenderness Negative Negative   Instability: Varus stable stable     Valgus stable stable   Special Tests: Lachman Negative Negative     Posterior drawer Negative Negative     Anterior drawer Negative Negative     Pivot shift not tested not tested     Dial not tested not tested   Patella: Palpation no tenderness no tenderness     Mobility 1/4 1/4     Apprehension Negative Negative   Other: Single leg 1/4 squat not tested not tested      Incisions show no erythema, no drainage    LE NV Exam: +2 DP/PT pulses bilaterally  Sensation intact to light touch L2-S1 bilaterally     Bilateral hip ROM demonstrates no pain actively or passively    No calf tenderness to palpation bilaterally      Scribe Attestation    I,:  Keily Watkinsable am acting as a scribe while in the presence of the attending physician :       I,:  Esteban Corado, DO personally performed the services described in this documentation    as scribed in my presence :

## 2022-09-29 NOTE — LETTER
September 29, 2022     Patient: Linda Kearney  YOB: 1965  Date of Visit: 9/29/2022      To Whom it May Concern:    Linda Kearney is under my professional care  Manishin Cowden was seen in my office on 9/29/2022  Manishin Cowden will remain out of work at this time until re evaluate in 3 weeks  If you have any questions or concerns, please don't hesitate to call           Sincerely,          Olimpia Al DO        CC: No Recipients

## 2022-10-03 ENCOUNTER — OFFICE VISIT (OUTPATIENT)
Dept: PHYSICAL THERAPY | Facility: CLINIC | Age: 57
End: 2022-10-03
Payer: OTHER MISCELLANEOUS

## 2022-10-03 DIAGNOSIS — M23.92 LOCKING KNEE, LEFT: Primary | ICD-10-CM

## 2022-10-03 DIAGNOSIS — M25.562 ACUTE PAIN OF LEFT KNEE: ICD-10-CM

## 2022-10-03 DIAGNOSIS — S83.105A ACUTE TRAUMATIC INTERNAL DERANGEMENT OF LEFT KNEE, INITIAL ENCOUNTER: ICD-10-CM

## 2022-10-03 DIAGNOSIS — M25.562 KNEE MENISCUS PAIN, LEFT: ICD-10-CM

## 2022-10-03 DIAGNOSIS — M23.92 LOCKING OF LEFT KNEE: ICD-10-CM

## 2022-10-03 PROCEDURE — 97112 NEUROMUSCULAR REEDUCATION: CPT | Performed by: PHYSICAL THERAPIST

## 2022-10-03 PROCEDURE — 97530 THERAPEUTIC ACTIVITIES: CPT | Performed by: PHYSICAL THERAPIST

## 2022-10-03 PROCEDURE — 97110 THERAPEUTIC EXERCISES: CPT | Performed by: PHYSICAL THERAPIST

## 2022-10-03 NOTE — PROGRESS NOTES
Daily Note     Today's date: 10/3/2022  Patient name: Raford Lefort  : 1965  MRN: [id number]  Referring provider: PACO Harper*  Dx:   Encounter Diagnosis     ICD-10-CM    1  Locking knee, left  M23 92    2  Acute traumatic internal derangement of left knee, initial encounter  S83 105A    3  Knee meniscus pain, left  M25 562    4  Acute pain of left knee  M25 562    5  Locking of left knee  M23 92           Subjective: Pt reported that he went to his ortho doc, who stated that he would like to hold him out of work for another 3 weeks to improve his strength and ROM  Objective: See treatment diary below    Assessment: Tolerated treatment well  Patient demonstrated fatigue post treatment, exhibited good technique with therapeutic exercises and would benefit from continued PT  Plan: Continue per plan of care        Precautions:   History of R knee injury     Manuals  10/3     STM  JZ JZ JZ JZ     PROM stretch JZ JZ JZ JZ     Pat mobs  Hold  Hold                 Neuro Re-Ed  10/3    SLS foam     :30x2 ea    Cone taps          BOSU squat      Shallow 3x10     SLS rebounder     Y 3x10 ea    Educated on balance      3'                       Ther Ex  103    SLR 2x10  2x10  3x15 ea 2# 3x10 ea  2# 3x10 ea    S/l hip abd 2x10  3x10  3x10 ea 3x10    S/l hip add   3x10       Seated hamstring stretch  :30x3 3 way :30x2 ea :30x3  :30x3 ea :30x3 straight on    Calf stretch step     :30x2 ea    LAQ 2x10     MRE 3x10     Stand knee flexion stretch  :30x2  Prone :30x3        Quad set  :10x10       Heel raise  3x10 3x15  3x20     Stand knee flexion AROM    3x10 ea     Seated quad isometric in extension    MRE :30x3                        Ther Activity  10/3    Step ups  Hold       Lateral step downs  Hold       STS  Hold   2 foam 3x10     U/l LP in plantar flexion     seat5  50/ 3x10              Updated HEP     5'     RB    5'  5' lvl3 5' lvl5

## 2022-10-05 ENCOUNTER — OFFICE VISIT (OUTPATIENT)
Dept: PHYSICAL THERAPY | Facility: CLINIC | Age: 57
End: 2022-10-05
Payer: OTHER MISCELLANEOUS

## 2022-10-05 DIAGNOSIS — S83.105A ACUTE TRAUMATIC INTERNAL DERANGEMENT OF LEFT KNEE, INITIAL ENCOUNTER: ICD-10-CM

## 2022-10-05 DIAGNOSIS — M23.92 LOCKING OF LEFT KNEE: Primary | ICD-10-CM

## 2022-10-05 DIAGNOSIS — M25.562 ACUTE PAIN OF LEFT KNEE: ICD-10-CM

## 2022-10-05 DIAGNOSIS — M25.562 KNEE MENISCUS PAIN, LEFT: ICD-10-CM

## 2022-10-05 DIAGNOSIS — M23.92 LOCKING KNEE, LEFT: ICD-10-CM

## 2022-10-05 PROCEDURE — 97530 THERAPEUTIC ACTIVITIES: CPT | Performed by: PHYSICAL THERAPIST

## 2022-10-05 PROCEDURE — 97110 THERAPEUTIC EXERCISES: CPT | Performed by: PHYSICAL THERAPIST

## 2022-10-05 PROCEDURE — 97112 NEUROMUSCULAR REEDUCATION: CPT | Performed by: PHYSICAL THERAPIST

## 2022-10-05 NOTE — PROGRESS NOTES
Daily Note     Today's date: 10/5/2022  Patient name: Peggy Goodman  : 1965  MRN: [id number]  Referring provider: PACO Moreira*  Dx:   Encounter Diagnosis     ICD-10-CM    1  Locking of left knee  M23 92    2  Acute traumatic internal derangement of left knee, initial encounter  S83 105A    3  Locking knee, left  M23 92    4  Acute pain of left knee  M25 562    5  Knee meniscus pain, left  M25 562           Subjective: Pt reported that he is "feeling great since the last workout "    Objective: See treatment diary below    Assessment: Tolerated treatment well  Patient demonstrated fatigue post treatment, exhibited good technique with therapeutic exercises and would benefit from continued PT  Plan: Continue per plan of care        Precautions:   History of R knee injury     Manuals 9/19 9/21 9/26 9/28 10/3  10/5   STM  JZ JZ JZ JZ     PROM stretch JZ JZ JZ JZ     Pat mobs  Hold  Hold                 Neuro Re-Ed 9/19 9/21 9/26 9/28 10/3 10/5   SLS foam     :30x2 ea    SLS BOSU flat       :30x3 ea   SLS BOSU round      :30x3 ea   Cone taps          BOSU squat      Shallow 3x10     SLS rebounder     Y 3x10 ea    Educated on balance      3'  5'                      Ther Ex 9/19 9/21 9/26 9/28 10/3 10/5   SLR 2x10  2x10  3x15 ea 2# 3x10 ea  2# 3x10 ea    S/l hip abd 2x10  3x10  3x10 ea 3x10    S/l hip add   3x10       Seated hamstring stretch  :30x3 3 way :30x2 ea :30x3  :30x3 ea :30x3 straight on :30x3 ea   Calf stretch step     :30x2 ea    LAQ 2x10     MRE 3x10  MRE 3x10   Stand knee flexion stretch  :30x2  Prone :30x3        Quad set  :10x10       Heel raise  3x10 3x15  3x20     Stand knee flexion AROM    3x10 ea     Seated quad isometric in extension    MRE :30x3      SAQ      MRE 3x10    Prone knee flexion      MRE 3x10                      Ther Activity 9/19 9/21 9/26 9/28 10/3 10/5   Step ups  Hold       Lateral step downs  Hold       STS  Hold   2 foam 3x10     U/l LP in plantar flexion seat5  50/ 3x10  seat4   70/ 3x10    U/l calf press       70/ 3x10    Updated HEP     5'     RB    5'  5' lvl3 5' lvl5 lvl5 5'

## 2022-10-10 ENCOUNTER — EVALUATION (OUTPATIENT)
Dept: PHYSICAL THERAPY | Facility: CLINIC | Age: 57
End: 2022-10-10
Payer: OTHER MISCELLANEOUS

## 2022-10-10 DIAGNOSIS — M23.92 LOCKING OF LEFT KNEE: Primary | ICD-10-CM

## 2022-10-10 DIAGNOSIS — M23.92 LOCKING KNEE, LEFT: ICD-10-CM

## 2022-10-10 DIAGNOSIS — M25.562 KNEE MENISCUS PAIN, LEFT: ICD-10-CM

## 2022-10-10 DIAGNOSIS — M25.562 ACUTE PAIN OF LEFT KNEE: ICD-10-CM

## 2022-10-10 DIAGNOSIS — S83.105A ACUTE TRAUMATIC INTERNAL DERANGEMENT OF LEFT KNEE, INITIAL ENCOUNTER: ICD-10-CM

## 2022-10-10 PROCEDURE — 97110 THERAPEUTIC EXERCISES: CPT | Performed by: PHYSICAL THERAPIST

## 2022-10-10 PROCEDURE — 97162 PT EVAL MOD COMPLEX 30 MIN: CPT | Performed by: PHYSICAL THERAPIST

## 2022-10-10 NOTE — PROGRESS NOTES
PT Re-Evaluation     Today's date: 10/10/2022  Patient name: Bishop Dickinson  : 1965  MRN: [id number]  Referring provider: Yves Morillo PA-C  Dx:   Encounter Diagnosis     ICD-10-CM    1  Acute pain of left knee  M25 562    2  Locking of left knee  M23 92    3  Knee meniscus pain, left  M25 562    4  Acute traumatic internal derangement of left knee, initial encounter  S83 105A        Assessment  Assessment details:   Since beginning PT Michelle Wolfe has attended 9 visits and reports GROC improvement of 60%  MMT and goniometry demonstrated improved strength and flexibility  SLS demonstrated improved balance  Improvements have resulted in improved functional ability  Despite improvements he continues to present with pain, decreased strength, decreased ROM, decreased joint mobility, joint effusion, ambulatory dysfunction and balance dysfunction s/p L knee menisectomy performed on 22  Due to these impairments, patient has difficulty performing ADL's, recreational activities, work-related activities, ambulation, stair negotiation, lifting/carrying, transfers  Patient's clinical presentation is consistent with their referring diagnosis of Acute pain of left knee  (primary encounter diagnosis), Locking of left knee, Knee meniscus pain, left, Acute traumatic internal derangement of left knee, initial encounter  Patient has been educated in home exercise program and plan of care   Patient would benefit from skilled physical therapy services to address their aforementioned functional limitations and progress towards prior level of function and independence with home exercise program      Impairments: abnormal gait, abnormal or restricted ROM, activity intolerance, impaired physical strength, lacks appropriate home exercise program, pain with function, weight-bearing intolerance, poor posture  and poor body mechanics  Functional limitations: walk/stand, STS, stair negotiation, squat, lunge/kneel, moving desks/safes for work   Prognosis: good  Prognosis details: + factors: high motivation levels  - factors: MRI + for meniscus     Goals  Short Term Goals to be accomplished by 9/19/22:  STG1: Pt will be I with HEP  Achieved  STG2: Pt will be able to ascend steps without use of handrail  Achieved  STG3: Pt will be able to perform STS without use of UE  Achieved  STG4: Pt will amb community distance without gait deviates due to pain  Achieved  Long Term Goals to be accomplished by 11/30/22:   LTG1: Pt will be able to step up/down off trailer at work for outdoor work duties  LTG2: Pt will be able to move school furniture, safe, desks  LTG3: Pt will be able to squat to lift objects at work repeatedly without knee pain  LTG4: Pt will return to full work day at full duty capacity as per PLOF  Plan  Plan details: HEP development, stretching, strengthening, A/AA/PROM, joint mobilizations, posture education, STM/MI as needed to reduce muscle tension, muscle reeducation, PLOC discussed and agreed upon with patient  Patient would benefit from: PT eval and skilled physical therapy  Planned modality interventions: cryotherapy, thermotherapy: hydrocollator packs and unattended electrical stimulation  Planned therapy interventions: manual therapy, neuromuscular re-education, self care, therapeutic activities, therapeutic exercise, home exercise program, patient education, joint mobilization, balance, strengthening, stretching, therapeutic training and flexibility  Frequency: 2x week  Duration in weeks: 12  Plan of Care beginning date: 8/22/2022  Plan of Care expiration date: 11/30/2022  Treatment plan discussed with: patient    Subjective Evaluation    History of Present Illness  Mechanism of injury: Pt is a 58 y/o male who presents to PT through [facility]  He works as a  for [employer]    DOI: 8/9/22  LAITH: He stated that he "tweaked" his knee when he was getting up onto a trailer at work  He reported the injury to his employer and he was sent to the ER at [place] where x-ray showed no fracture  MRI was last Thursday,  And he has an appointment with ortho this Friday where he will review the results of his MRI and discuss a plan of care  He has already returned to work, however he is trying to stay off his feet as much as possible  He is not performing lifting activities  He is doing work duties  He had surgery on 22 and he was cleared by his surgeon to begin PT  Pain  Current pain ratin  At best pain ratin  At worst pain ratin  Location: L knee   Quality: throbbing, tight, pressure and discomfort  Relieving factors: ice and relaxation  Aggravating factors: standing, walking, stair climbing, sitting and lifting    Treatments  Current treatment: physical therapy  Patient Goals  Patient goals for therapy: increased motion, improved balance, decreased pain, increased strength, independence with ADLs/IADLs, return to sport/leisure activities and return to work  Patient goal: STS, stair negotiation, squat, lunge, kneel, work duties, lifting/carrying     Objective     Observations   Left Knee   Positive for edema  Additional Observation Details  Brace during standing/walking    Tenderness   Left Knee   Tenderness in the ITB, MCL (distal), MCL (proximal), medial joint line and pes anserinus  Active Range of Motion   Left Knee   Flexion: 132 degrees   Extension: 0 degrees     Right Knee   Flexion: 145 degrees   Extension: 0 degrees     Mobility   Patellar Mobility:   Left Knee   Hypomobile: left medial, left lateral, left superior and left inferior    Strength/Myotome Testing     Left Knee   Flexion: 4/5  Extension: 4/5 (5 degree quadriceps lag)    Additional Strength Details  U/l heel raise:   R: 20 reps  L: 20 reps     Tests     Left Knee   Negative valgus stress test at 30 degrees and varus stress test at 30 degrees       Additional Tests Details  SLS   R: 30 sec  L: 30 sec      Precautions:   History of R knee injury    Manuals 10/10   9/28 10/3  10/5   STM  JZ   JZ     PROM stretch JZ   JZ     Pat mobs  JZ                 Neuro Re-Ed 10/10   9/28 10/3 10/5   SLS foam     :30x2 ea    SLS BOSU flat       :30x3 ea   SLS BOSU round      :30x3 ea   Cone taps          BOSU squat      Shallow 3x10     SLS rebounder     Y 3x10 ea    Educated on balance      3'  5'                      Ther Ex 10/10   9/28 10/3 10/5   SLR    2# 3x10 ea  2# 3x10 ea    S/l hip abd    3x10 ea 3x10    S/l hip add         Seated hamstring stretch     :30x3 ea :30x3 straight on :30x3 ea   Calf stretch step     :30x2 ea    LAQ 3x10 MRE    MRE 3x10  MRE 3x10   Stand knee flexion stretch          Quad set         Heel raise    3x20     Stand knee flexion AROM    3x10 ea     Seated quad isometric in extension MRE :30x3    MRE :30x3      SAQ 3x10 MRE     MRE 3x10    Prone knee flexion 3x10 MRE     MRE 3x10                      Ther Activity 10/10   9/28 10/3 10/5   Step ups         Lateral step downs         STS     2 foam 3x10     U/l LP in plantar flexion seat4   70/ 10x   90/ 3x10    seat5  50/ 3x10  seat4   70/ 3x10    U/l calf press  90/ 3x10 ea     70/ 3x10    Updated HEP     5'     RB     5' lvl3 5' lvl5 lvl5 5'

## 2022-10-12 ENCOUNTER — OFFICE VISIT (OUTPATIENT)
Dept: PHYSICAL THERAPY | Facility: CLINIC | Age: 57
End: 2022-10-12
Payer: OTHER MISCELLANEOUS

## 2022-10-12 DIAGNOSIS — M25.562 ACUTE PAIN OF LEFT KNEE: Primary | ICD-10-CM

## 2022-10-12 PROCEDURE — 97112 NEUROMUSCULAR REEDUCATION: CPT

## 2022-10-12 PROCEDURE — 97110 THERAPEUTIC EXERCISES: CPT

## 2022-10-12 PROCEDURE — 97140 MANUAL THERAPY 1/> REGIONS: CPT

## 2022-10-12 NOTE — PROGRESS NOTES
Daily Note     Today's date: 10/12/2022  Patient name: Chloe Sullivan  : 1965  MRN: [id number]  Referring provider: PACO Tidwell*  Dx:   Encounter Diagnosis     ICD-10-CM    1  Acute pain of left knee  M25 562                   Subjective: Narda Jesus denies L knee soreness throughout daily activities  He continues to report gradual progress with LLE strength  Objective: See treatment diary below      Assessment: Peter tolerated PT treatment well  Knee ROM is progressing approprietly, end rage flexion limited at this time  Increased tone present throughout distal quad, STM performed to address  Good quad and hamstring strength demonstrated with MRE  Advanced weight on leg press w/o provocation  To further challenge SL balance added bubble balance and cone reach, minimal instability present  Pt would benefit from continued PT to further address impairments and maximize functional level  Plan: Continue per plan of care        Precautions:   History of R knee injury     Manuals 10/10 10/12  9/28 10/3  10/5   STM  JZ ROGELIO CLARKE     PROM stretch JZ ROGELIO CLARKE     Pat mobs  JZ                 Neuro Re-Ed 10/10 10/12  9/28 10/3 10/5   SLS foam     :30x2 ea    SLS BOSU flat   Blue bubble :30x3 ea     :30x3 ea   SLS BOSU round      :30x3 ea   Cone taps   Foam SLS reach 2x10 ea        BOSU squat      Shallow 3x10     SLS rebounder     Y 3x10 ea    Educated on balance      3'  5'                      Ther Ex 10/10 10/12  9/28 10/3 10/5   SLR    2# 3x10 ea  2# 3x10 ea    S/l hip abd    3x10 ea 3x10    S/l hip add         Seated hamstring stretch     :30x3 ea :30x3 straight on :30x3 ea   Calf stretch step  :30x3 ea   :30x2 ea    LAQ 3x10 MRE    MRE 3x10  MRE 3x10   Stand knee flexion stretch          Quad set         Heel raise    3x20     Stand knee flexion AROM    3x10 ea     Seated quad isometric in extension MRE :30x3  MRE :30x3   MRE :30x3      SAQ 3x10 MRE     MRE 3x10    Prone knee flexion 3x10 MRE 3x10 MRE MRE 3x10                      Ther Activity 10/10 10/12  9/28 10/3 10/5   Step ups         Lateral step downs         STS     2 foam 3x10     U/l LP in plantar flexion seat4   70/ 10x   90/ 3x10 seat4   70/ 10x   90/ 10x  110/ 2x10    seat5  50/ 3x10  seat4   70/ 3x10    U/l calf press  90/ 3x10 ea 90/ 3x10 ea    70/ 3x10    Updated HEP     5'     RB   lvl 3 5'   5' lvl3 5' lvl5 lvl5 5'

## 2022-10-17 ENCOUNTER — OFFICE VISIT (OUTPATIENT)
Dept: PHYSICAL THERAPY | Facility: CLINIC | Age: 57
End: 2022-10-17
Payer: OTHER MISCELLANEOUS

## 2022-10-17 DIAGNOSIS — M23.92 LOCKING KNEE, LEFT: Primary | ICD-10-CM

## 2022-10-17 PROCEDURE — 97530 THERAPEUTIC ACTIVITIES: CPT

## 2022-10-17 PROCEDURE — 97110 THERAPEUTIC EXERCISES: CPT

## 2022-10-17 NOTE — PROGRESS NOTES
Daily Note     Today's date: 10/17/2022  Patient name: Tray Santoyo  : 1965  MRN: [id number]  Referring provider: Ralph Snellen, PA*  Dx:   Encounter Diagnosis     ICD-10-CM    1  Locking knee, left  M23 92                   Subjective: Augustina Whittaker denies L knee soreness following last PT session  He continues to have minimal pain with daily activities and feeling stronger each week  Objective: See treatment diary below    Assessment: Jaycob tolerated PT treatment well  Added u/l STS to diary, foam A required to obtain full upright transfer  TB cues used to correct L knee valgus with activity  Introduced stair descending, pt challenged but performed w/o provocation  Added forward step down NV  Pt would benefit from continued PT to further address impairments and maximize functional level  Plan: Continue per plan of care        Precautions:   History of R knee injury     Manuals 10/10 10/12 10/17  10/3  10/5   STM  JZ JW JW      PROM stretch JZ JW JW      Pat mobs  JZ                 Neuro Re-Ed 10/10 10/12 10/17  10/3 10/5   SLS foam     :30x2 ea    SLS BOSU flat   Blue bubble :30x3 ea     :30x3 ea   SLS BOSU round      :30x3 ea   Cone taps   Foam SLS reach 2x10 ea        BOSU squat      Shallow 3x10     SLS rebounder     Y 3x10 ea    Educated on balance      3'  5'                      Ther Ex 10/10 10/12 10/17  10/3 10/5   SLR     2# 3x10 ea    S/l hip abd     3x10    S/l hip add         Seated hamstring stretch      :30x3 straight on :30x3 ea   Calf stretch step  :30x3 ea :30x3 ea   :30x2 ea    LAQ 3x10 MRE    MRE 3x10  MRE 3x10   Stand knee flexion stretch          Quad set         Heel raise         Stand knee flexion AROM         Seated quad isometric in extension MRE :30x3  MRE :30x3  MRE :30x3       SAQ 3x10 MRE     MRE 3x10    Prone knee flexion 3x10 MRE 3x10 MRE    MRE 3x10                      Ther Activity 10/10 10/12 10/17  10/3 10/5   Step ups         Lateral step downs   6" 2x10 GTB STS   U/l STS 3x10 1 foam   2 foam 3x10     U/l LP in plantar flexion seat4   70/ 10x   90/ 3x10 seat4   70/ 10x   90/ 10x  110/ 2x10  seat4   90/ 10x   110/ 2x10     seat5  50/ 3x10  seat4   70/ 3x10    U/l calf press  90/ 3x10 ea 90/ 3x10 ea 110/ 3x10    70/ 3x10    Updated HEP     5'     RB   lvl 3 5'  Lvl 5 5'   5' lvl5 lvl5 5'

## 2022-10-19 ENCOUNTER — OFFICE VISIT (OUTPATIENT)
Dept: PHYSICAL THERAPY | Facility: CLINIC | Age: 57
End: 2022-10-19
Payer: OTHER MISCELLANEOUS

## 2022-10-19 DIAGNOSIS — S83.105A ACUTE TRAUMATIC INTERNAL DERANGEMENT OF LEFT KNEE, INITIAL ENCOUNTER: Primary | ICD-10-CM

## 2022-10-19 PROCEDURE — 97530 THERAPEUTIC ACTIVITIES: CPT

## 2022-10-19 PROCEDURE — 97110 THERAPEUTIC EXERCISES: CPT

## 2022-10-19 PROCEDURE — 97112 NEUROMUSCULAR REEDUCATION: CPT

## 2022-10-19 NOTE — PROGRESS NOTES
Daily Note     Today's date: 10/19/2022  Patient name: Linda Kearney  : 1965  MRN: [id number]  Referring provider: PACO Elmore*  Dx:   Encounter Diagnosis     ICD-10-CM    1  Acute traumatic internal derangement of left knee, initial encounter  S83 105A                   Subjective: Rollin Cowden denies L knee soreness following last PT session  Pt has 5 week follow up with surgeon on Friday  Objective: See treatment diary below    Assessment: Jaycob tolerated PT treatment well  Pt is challenged with current exercise program  Less knee valgus displayed with stair navigation, self corrected w/o cueing  Added forward step down w/o provocation  To further address glute weakness, u/l bridge added to exercise diary  Great neuromuscular control displayed with bosu squats  Pt would benefit from continued PT to further address impairments and maximize functional level  Plan: Continue per plan of care        Precautions:   History of R knee injury     Manuals 10/10 10/12 10/17 10/19  10/5   STM  JZ JW JW      PROM stretch JZ Cherylene Freud Pat mobs  JZ                 Neuro Re-Ed 10/10 10/12 10/17 10/19  10/5   SLS foam         SLS BOSU flat   Blue bubble :30x3 ea     :30x3 ea   SLS BOSU round      :30x3 ea   Cone taps   Foam SLS reach 2x10 ea        BOSU squat     2x10      SLS rebounder         Educated on balance       5'                      Ther Ex 10/10 10/12 10/17 10/19  10/5   SLR         S/l hip abd         S/l hip add         Seated hamstring stretch       :30x3 ea   Calf stretch step  :30x3 ea :30x3 ea  :30x3 ea      LAQ 3x10 MRE     MRE 3x10   Stand knee flexion stretch          Quad set         Heel raise         Stand knee flexion AROM         Seated quad isometric in extension MRE :30x3  MRE :30x3  MRE :30x3       SAQ 3x10 MRE     MRE 3x10    Prone knee flexion 3x10 MRE 3x10 MRE    MRE 3x10    U/l bridge     3x10               Ther Activity 10/10 10/12 10/17 10/19  10/5   Step ups         Fwd step downs     6" 2x10      Lateral step downs   6" 2x10 GTB 6" 3x10      STS   U/l STS 3x10 1 foam  U/l STS 3x10 1 foam      U/l LP in plantar flexion seat4   70/ 10x   90/ 3x10 seat4   70/ 10x   90/ 10x  110/ 2x10  seat4   90/ 10x   110/ 2x10    Seat 4 110/ 2x10  120/ x10   seat4   70/ 3x10    U/l calf press  90/ 3x10 ea 90/ 3x10 ea 110/ 3x10  90/ 3x10   70/ 3x10    RB   lvl 3 5'  Lvl 5 5'  Lvl 5 5'   lvl5 5'

## 2022-10-21 ENCOUNTER — OFFICE VISIT (OUTPATIENT)
Dept: OBGYN CLINIC | Facility: MEDICAL CENTER | Age: 57
End: 2022-10-21

## 2022-10-21 VITALS
SYSTOLIC BLOOD PRESSURE: 147 MMHG | HEIGHT: 72 IN | DIASTOLIC BLOOD PRESSURE: 90 MMHG | HEART RATE: 69 BPM | WEIGHT: 233 LBS | BODY MASS INDEX: 31.56 KG/M2

## 2022-10-21 DIAGNOSIS — Z98.890 S/P ARTHROSCOPY OF LEFT KNEE: Primary | ICD-10-CM

## 2022-10-21 PROCEDURE — 99024 POSTOP FOLLOW-UP VISIT: CPT | Performed by: ORTHOPAEDIC SURGERY

## 2022-10-21 NOTE — LETTER
October 21, 2022     Patient: Serina Magallon  YOB: 1965  Date of Visit: 10/21/2022      To Whom it May Concern:    Serina Magallon is under my professional care  Fredo Huston was seen in my office on 10/21/2022  Fredo Huston can return to normal work duties as of 10/24/22  If you have any questions or concerns, please don't hesitate to call           Sincerely,          Genevieve Mckenzie DO        CC: No Recipients

## 2022-10-21 NOTE — PROGRESS NOTES
Knee Post Operative Visit     Assesment:     Surgical Arthroscopy of the left knee with partial medial meniscectomy with grade III MFC, grade IV patellofemoral 9/14/22 overall doing well     Plan:    Post-Operative treatment:    Ice to knee for 20 minutes at least 1-2 times daily  OTC NSAIDS prn for pain  He can transition from formal therapy to HEP  Work note given for normal duties, if he needs not modified he can call   See patient back in 6 weeks for final visit  Imaging:    No imaging was available for review today  Weight bearing:  as tolerated     ROM:  Full    Brace:  No brace needed      Follow up:   6 weeks    Patient was advised that if they have any fevers, chills, chest pain, shortness of breath, redness or drainage from the incision, please let our office know immediately  History of Present Illness:    59 yr old male s/p left knee with partial medial meniscectomy with grade III MFC, grade IV patellofemoral 9/14/22  Since the prior visit, He reports significant improvement  Pain is well controlled  The patient is using ice to control swelling  Physical therapy is going well  He has some mild swelling in his knee  Denies any locking or instability  He feels like he is ready to get back to work  Kneeling is painful  The patient has been ambulating without crutches  The patient has been ambulating without a brace  The patient denies any fevers, chills, calf pain, chest pain/shortness of breath, redness or drainage from the incision  I have reviewed the past medical, surgical, social and family history, medications and allergies as documented in the EMR  Review of systems: ROS is negative other than that noted in the HPI  Constitutional: Negative for fatigue and fever  Physical Exam:    Blood pressure 147/90, pulse 69, height 6' (1 829 m), weight 106 kg (233 lb)      General/Constitutional: NAD, well developed, well nourished  HENT: Normocephalic, atraumatic  CV: Intact distal pulses, regular rate  Resp: No respiratory distress or labored breathing  Lymphatic: No lymphadenopathy palpated  Neuro: Alert and Oriented x 3, no focal deficits  Psych: Normal mood, normal affect, normal judgement, normal behavior  Skin: Warm, dry, no rashes, no erythema       Knee Exam (focused):                   RIGHT LEFT   ROM:   0-130 0-130   Palpation: Effusion negative Positive      MJL tenderness Negative Negative     LJL tenderness Negative Negative   Instability: Varus stable stable     Valgus stable stable   Special Tests: Lachman Negative Negative     Posterior drawer Negative Negative     Anterior drawer Negative Negative     Pivot shift not tested not tested     Dial not tested not tested   Patella: Palpation no tenderness Portal sites      Mobility 1/4 1/4     Apprehension Negative Negative   Other: Single leg 1/4 squat not tested not tested      Incisions show no erythema, no drainage    LE NV Exam: +2 DP/PT pulses bilaterally  Sensation intact to light touch L2-S1 bilaterally     Bilateral hip ROM demonstrates no pain actively or passively    No calf tenderness to palpation bilaterally      Scribe Attestation    I,:  Harley Martinez am acting as a scribe while in the presence of the attending physician :       I,:  Ame Corado DO personally performed the services described in this documentation    as scribed in my presence :

## 2022-10-21 NOTE — PROGRESS NOTES
Fausto Hill has been d/c'd from PT by his surgeon  Stated that he has been cleared to return to work by his physician

## 2022-10-24 ENCOUNTER — APPOINTMENT (OUTPATIENT)
Dept: PHYSICAL THERAPY | Facility: CLINIC | Age: 57
End: 2022-10-24

## 2022-10-26 ENCOUNTER — APPOINTMENT (OUTPATIENT)
Dept: PHYSICAL THERAPY | Facility: CLINIC | Age: 57
End: 2022-10-26

## 2023-10-15 ENCOUNTER — OFFICE VISIT (OUTPATIENT)
Dept: URGENT CARE | Facility: CLINIC | Age: 58
End: 2023-10-15
Payer: COMMERCIAL

## 2023-10-15 ENCOUNTER — HOSPITAL ENCOUNTER (OUTPATIENT)
Facility: HOSPITAL | Age: 58
Setting detail: OBSERVATION
Discharge: HOME | End: 2023-10-17
Attending: EMERGENCY MEDICINE | Admitting: INTERNAL MEDICINE
Payer: COMMERCIAL

## 2023-10-15 VITALS
SYSTOLIC BLOOD PRESSURE: 160 MMHG | HEART RATE: 76 BPM | RESPIRATION RATE: 18 BRPM | TEMPERATURE: 98.1 F | OXYGEN SATURATION: 98 % | DIASTOLIC BLOOD PRESSURE: 86 MMHG

## 2023-10-15 DIAGNOSIS — L03.012 PARONYCHIA OF FINGER OF LEFT HAND: Primary | ICD-10-CM

## 2023-10-15 DIAGNOSIS — E78.5 HYPERLIPIDEMIA: ICD-10-CM

## 2023-10-15 DIAGNOSIS — I10 HYPERTENSION: ICD-10-CM

## 2023-10-15 DIAGNOSIS — I89.1 LYMPHANGITIS: ICD-10-CM

## 2023-10-15 LAB
ALBUMIN SERPL BCP-MCNC: 4.8 G/DL (ref 3.5–5)
ALP SERPL-CCNC: 52 U/L (ref 34–104)
ALT SERPL W P-5'-P-CCNC: 23 U/L (ref 7–52)
ANION GAP SERPL CALCULATED.3IONS-SCNC: 6 MMOL/L
APTT PPP: 30 SECONDS (ref 23–37)
AST SERPL W P-5'-P-CCNC: 16 U/L (ref 13–39)
BASOPHILS # BLD AUTO: 0.05 THOUSANDS/ÂΜL (ref 0–0.1)
BASOPHILS NFR BLD AUTO: 1 % (ref 0–1)
BILIRUB SERPL-MCNC: 0.36 MG/DL (ref 0.2–1)
BUN SERPL-MCNC: 15 MG/DL (ref 5–25)
CALCIUM SERPL-MCNC: 9.7 MG/DL (ref 8.4–10.2)
CHLORIDE SERPL-SCNC: 105 MMOL/L (ref 96–108)
CO2 SERPL-SCNC: 27 MMOL/L (ref 21–32)
CREAT SERPL-MCNC: 0.83 MG/DL (ref 0.6–1.3)
EOSINOPHIL # BLD AUTO: 0.22 THOUSAND/ÂΜL (ref 0–0.61)
EOSINOPHIL NFR BLD AUTO: 3 % (ref 0–6)
ERYTHROCYTE [DISTWIDTH] IN BLOOD BY AUTOMATED COUNT: 12.1 % (ref 11.6–15.1)
GFR SERPL CREATININE-BSD FRML MDRD: 95 ML/MIN/1.73SQ M
GLUCOSE SERPL-MCNC: 108 MG/DL (ref 65–140)
HCT VFR BLD AUTO: 41.5 % (ref 36.5–49.3)
HGB BLD-MCNC: 14 G/DL (ref 12–17)
IMM GRANULOCYTES # BLD AUTO: 0.04 THOUSAND/UL (ref 0–0.2)
IMM GRANULOCYTES NFR BLD AUTO: 1 % (ref 0–2)
INR PPP: 0.96 (ref 0.84–1.19)
LACTATE SERPL-SCNC: 0.7 MMOL/L (ref 0.5–2)
LYMPHOCYTES # BLD AUTO: 2.09 THOUSANDS/ÂΜL (ref 0.6–4.47)
LYMPHOCYTES NFR BLD AUTO: 30 % (ref 14–44)
MCH RBC QN AUTO: 29.7 PG (ref 26.8–34.3)
MCHC RBC AUTO-ENTMCNC: 33.7 G/DL (ref 31.4–37.4)
MCV RBC AUTO: 88 FL (ref 82–98)
MONOCYTES # BLD AUTO: 0.62 THOUSAND/ÂΜL (ref 0.17–1.22)
MONOCYTES NFR BLD AUTO: 9 % (ref 4–12)
NEUTROPHILS # BLD AUTO: 4.07 THOUSANDS/ÂΜL (ref 1.85–7.62)
NEUTS SEG NFR BLD AUTO: 56 % (ref 43–75)
NRBC BLD AUTO-RTO: 0 /100 WBCS
PLATELET # BLD AUTO: 321 THOUSANDS/UL (ref 149–390)
PMV BLD AUTO: 9.4 FL (ref 8.9–12.7)
POTASSIUM SERPL-SCNC: 4.1 MMOL/L (ref 3.5–5.3)
PROCALCITONIN SERPL-MCNC: <0.05 NG/ML
PROT SERPL-MCNC: 7.2 G/DL (ref 6.4–8.4)
PROTHROMBIN TIME: 13.2 SECONDS (ref 11.6–14.5)
RBC # BLD AUTO: 4.72 MILLION/UL (ref 3.88–5.62)
SODIUM SERPL-SCNC: 138 MMOL/L (ref 135–147)
WBC # BLD AUTO: 7.09 THOUSAND/UL (ref 4.31–10.16)

## 2023-10-15 PROCEDURE — 80053 COMPREHEN METABOLIC PANEL: CPT

## 2023-10-15 PROCEDURE — 83605 ASSAY OF LACTIC ACID: CPT

## 2023-10-15 PROCEDURE — 85730 THROMBOPLASTIN TIME PARTIAL: CPT

## 2023-10-15 PROCEDURE — 85025 COMPLETE CBC W/AUTO DIFF WBC: CPT

## 2023-10-15 PROCEDURE — 36415 COLL VENOUS BLD VENIPUNCTURE: CPT

## 2023-10-15 PROCEDURE — 87040 BLOOD CULTURE FOR BACTERIA: CPT

## 2023-10-15 PROCEDURE — 84145 PROCALCITONIN (PCT): CPT

## 2023-10-15 PROCEDURE — 10060 I&D ABSCESS SIMPLE/SINGLE: CPT

## 2023-10-15 PROCEDURE — 85610 PROTHROMBIN TIME: CPT

## 2023-10-15 PROCEDURE — 99213 OFFICE O/P EST LOW 20 MIN: CPT

## 2023-10-15 PROCEDURE — 99223 1ST HOSP IP/OBS HIGH 75: CPT | Performed by: INTERNAL MEDICINE

## 2023-10-15 RX ORDER — ACETAMINOPHEN 325 MG/1
650 TABLET ORAL EVERY 6 HOURS PRN
Status: DISCONTINUED | OUTPATIENT
Start: 2023-10-15 | End: 2023-10-17 | Stop reason: HOSPADM

## 2023-10-15 RX ORDER — PRAVASTATIN SODIUM 20 MG
20 TABLET ORAL DAILY
Status: DISCONTINUED | OUTPATIENT
Start: 2023-10-16 | End: 2023-10-17 | Stop reason: HOSPADM

## 2023-10-15 RX ORDER — CEPHALEXIN 500 MG/1
500 CAPSULE ORAL EVERY 12 HOURS SCHEDULED
Qty: 14 CAPSULE | Refills: 0 | Status: SHIPPED | OUTPATIENT
Start: 2023-10-15 | End: 2023-10-17

## 2023-10-15 RX ORDER — CEFEPIME HYDROCHLORIDE 2 G/50ML
2000 INJECTION, SOLUTION INTRAVENOUS ONCE
Status: COMPLETED | OUTPATIENT
Start: 2023-10-15 | End: 2023-10-15

## 2023-10-15 RX ORDER — CARVEDILOL 25 MG/1
25 TABLET ORAL 2 TIMES DAILY
COMMUNITY
Start: 2023-08-03

## 2023-10-15 RX ORDER — CARVEDILOL 25 MG/1
25 TABLET ORAL 2 TIMES DAILY
Status: DISCONTINUED | OUTPATIENT
Start: 2023-10-16 | End: 2023-10-17 | Stop reason: HOSPADM

## 2023-10-15 RX ORDER — FENOFIBRATE 145 MG/1
145 TABLET, COATED ORAL DAILY
Status: DISCONTINUED | OUTPATIENT
Start: 2023-10-16 | End: 2023-10-17 | Stop reason: HOSPADM

## 2023-10-15 RX ORDER — CEFEPIME HYDROCHLORIDE 2 G/50ML
2000 INJECTION, SOLUTION INTRAVENOUS EVERY 12 HOURS
Status: DISCONTINUED | OUTPATIENT
Start: 2023-10-16 | End: 2023-10-17 | Stop reason: HOSPADM

## 2023-10-15 RX ORDER — ONDANSETRON 2 MG/ML
4 INJECTION INTRAMUSCULAR; INTRAVENOUS EVERY 6 HOURS PRN
Status: DISCONTINUED | OUTPATIENT
Start: 2023-10-15 | End: 2023-10-17 | Stop reason: HOSPADM

## 2023-10-15 RX ORDER — HEPARIN SODIUM 5000 [USP'U]/ML
5000 INJECTION, SOLUTION INTRAVENOUS; SUBCUTANEOUS EVERY 8 HOURS SCHEDULED
Status: DISCONTINUED | OUTPATIENT
Start: 2023-10-16 | End: 2023-10-17 | Stop reason: HOSPADM

## 2023-10-15 RX ADMIN — CEFEPIME HYDROCHLORIDE 2000 MG: 2 INJECTION, SOLUTION INTRAVENOUS at 21:36

## 2023-10-15 NOTE — PATIENT INSTRUCTIONS
You have been prescribed Keflex - take as directed. You should do warm water and Epsom salt soaks twice daily for the next 2-3 days. Monitor for worsening signs of infection - increasing redness, swelling, warmth, pus drainage, or fevers/chills. Follow-up with your PCP as needed. Go to the ED for any worsening symptoms.

## 2023-10-15 NOTE — PROGRESS NOTES
[facility]        NAME: Baltazar Arteaga is a 59 y.o. male  : 1965    MRN: [id number]  DATE: October 15, 2023  TIME: 10:37 AM    Assessment and Plan   Paronychia of finger of left hand [L03.012]  1. Paronychia of finger of left hand  cephalexin (KEFLEX) 500 mg capsule    Incision and drain            Patient Instructions     You have been prescribed Keflex - take as directed. You should do warm water and Epsom salt soaks twice daily for the next 2-3 days. Monitor for worsening signs of infection - increasing redness, swelling, warmth, pus drainage, or fevers/chills. Follow-up with your PCP as needed. Go to the ED for any worsening symptoms. Chief Complaint     Chief Complaint   Patient presents with    Left Middle Finger Irritation/Possible Infection         History of Present Illness       This is a 63yo male who presents with left middle finger redness, pain, and swelling x2-3 days. Patient noticed worsening redness today, prompting clinic evaluation. He denies numbness and tingling. No fevers/chills. Notes hx same years ago, resolved after family MD performed I&D and prescribed antibiotics. Review of Systems   Review of Systems   Constitutional:  Negative for chills and fever. Respiratory:  Negative for chest tightness and shortness of breath. Cardiovascular:  Negative for chest pain and palpitations. Musculoskeletal:  Positive for arthralgias (left middle finger). Negative for myalgias. Skin:  Negative for rash. Redness to left middle finger   Neurological:  Negative for dizziness, light-headedness and headaches.          Current Medications       Current Outpatient Medications:     carvedilol (COREG) 25 mg tablet, Take 25 mg by mouth 2 (two) times a day, Disp: , Rfl:     cephalexin (KEFLEX) 500 mg capsule, Take 1 capsule (500 mg total) by mouth every 12 (twelve) hours for 7 days, Disp: 14 capsule, Rfl: 0    fenofibrate (TRIGLIDE) 160 MG tablet, Take 160 mg by mouth daily, Disp: , Rfl:     pravastatin (PRAVACHOL) 20 mg tablet, Take 20 mg by mouth daily, Disp: , Rfl:     aspirin (ECOTRIN) 325 mg EC tablet, Take 1 tablet (325 mg total) by mouth daily (Patient not taking: Reported on 10/15/2023), Disp: 30 tablet, Rfl: 0    carvedilol (COREG) 12.5 mg tablet, Take 12.5 mg by mouth 2 (two) times a day with meals (Patient not taking: Reported on 10/15/2023), Disp: , Rfl:     oxyCODONE (Roxicodone) 5 immediate release tablet, Take 1 tablet (5 mg total) by mouth every 4 (four) hours as needed for moderate pain for up to 15 doses Max Daily Amount: 30 mg (Patient not taking: Reported on 9/29/2022), Disp: 15 tablet, Rfl: 0    Current Allergies     Allergies as of 10/15/2023    (No Known Allergies)            The following portions of the patient's history were reviewed and updated as appropriate: allergies, current medications, past family history, past medical history, past social history, past surgical history and problem list.     Past Medical History:   Diagnosis Date    Hyperlipidemia     Hypertension        Past Surgical History:   Procedure Laterality Date    KNEE ARTHROSCOPY W/ MENISCAL REPAIR Left 9/14/2022    Procedure: medial meniscectomy;  Surgeon: Sofi Mcallister DO;  Location:  MAIN OR;  Service: Orthopedics    SHOULDER SURGERY Bilateral     rotator cuff repair       Family History   Problem Relation Age of Onset    Hypertension Mother     Glaucoma Father     Hyperlipidemia Father     Hypertension Father          Medications have been verified. Objective   /86   Pulse 76   Temp 98.1 °F (36.7 °C) (Tympanic)   Resp 18   SpO2 98%        Physical Exam     Physical Exam  Vitals and nursing note reviewed. Constitutional:       General: He is not in acute distress. Appearance: He is not ill-appearing. HENT:      Head: Normocephalic. Mouth/Throat:      Mouth: Mucous membranes are moist.   Cardiovascular:      Rate and Rhythm: Normal rate. Pulses: Normal pulses. Pulmonary:      Effort: Pulmonary effort is normal.   Musculoskeletal:         General: Normal range of motion. Cervical back: Normal range of motion and neck supple. Skin:     General: Skin is warm and dry. Capillary Refill: Capillary refill takes less than 2 seconds. Comments: Left middle finger: Erythema to skin over distal phalanx with visible pocket of pus to lateral nail fold. +swelling. TTP. Sensation intact. +2 radial pulse. Cap refill 2 sec. Neurological:      Mental Status: He is alert and oriented to person, place, and time. Incision and drain    Date/Time: 10/15/2023 9:15 AM    Performed by: MONTRELL Mnan  Authorized by: MONTRELL Mann  Universal Protocol:  Consent: Verbal consent obtained. Risks and benefits: risks, benefits and alternatives were discussed  Consent given by: patient  Patient understanding: patient states understanding of the procedure being performed  Patient identity confirmed: verbally with patient    Patient location:  Clinic  Location:     Type:  Abscess (paronychia)    Location:  Upper extremity    Upper extremity location:  L long finger  Pre-procedure details:     Procedure prep: Wound cleanser. Skin preparation:  Alcohol prep pad  Anesthesia (see MAR for exact dosages): Anesthesia method:  Local infiltration    Local anesthetic:  Lidocaine 1% w/o epi  Procedure details:     Complexity:  Simple    Incision types:  Stab incision    Scalpel blade:  11    Approach:  Puncture    Incision depth:  Subcutaneous    Wound management:  Probed and deloculated and irrigated with saline    Drainage:  Purulent    Drainage amount:  Scant    Wound treatment:  Wound left open    Packing materials:  None  Post-procedure details:     Patient tolerance of procedure: Tolerated well, no immediate complications  Comments:      Dry dressing placed.

## 2023-10-16 LAB
ANION GAP SERPL CALCULATED.3IONS-SCNC: 7 MMOL/L
BASOPHILS # BLD AUTO: 0.04 THOUSANDS/ÂΜL (ref 0–0.1)
BASOPHILS NFR BLD AUTO: 1 % (ref 0–1)
BUN SERPL-MCNC: 13 MG/DL (ref 5–25)
CALCIUM SERPL-MCNC: 9.5 MG/DL (ref 8.4–10.2)
CHLORIDE SERPL-SCNC: 107 MMOL/L (ref 96–108)
CO2 SERPL-SCNC: 25 MMOL/L (ref 21–32)
CREAT SERPL-MCNC: 0.74 MG/DL (ref 0.6–1.3)
EOSINOPHIL # BLD AUTO: 0.21 THOUSAND/ÂΜL (ref 0–0.61)
EOSINOPHIL NFR BLD AUTO: 3 % (ref 0–6)
ERYTHROCYTE [DISTWIDTH] IN BLOOD BY AUTOMATED COUNT: 12.1 % (ref 11.6–15.1)
GFR SERPL CREATININE-BSD FRML MDRD: 100 ML/MIN/1.73SQ M
GLUCOSE P FAST SERPL-MCNC: 101 MG/DL (ref 65–99)
GLUCOSE SERPL-MCNC: 101 MG/DL (ref 65–140)
HCT VFR BLD AUTO: 40.4 % (ref 36.5–49.3)
HGB BLD-MCNC: 13.6 G/DL (ref 12–17)
IMM GRANULOCYTES # BLD AUTO: 0.02 THOUSAND/UL (ref 0–0.2)
IMM GRANULOCYTES NFR BLD AUTO: 0 % (ref 0–2)
LYMPHOCYTES # BLD AUTO: 1.76 THOUSANDS/ÂΜL (ref 0.6–4.47)
LYMPHOCYTES NFR BLD AUTO: 28 % (ref 14–44)
MCH RBC QN AUTO: 29.4 PG (ref 26.8–34.3)
MCHC RBC AUTO-ENTMCNC: 33.7 G/DL (ref 31.4–37.4)
MCV RBC AUTO: 87 FL (ref 82–98)
MONOCYTES # BLD AUTO: 0.55 THOUSAND/ÂΜL (ref 0.17–1.22)
MONOCYTES NFR BLD AUTO: 9 % (ref 4–12)
NEUTROPHILS # BLD AUTO: 3.62 THOUSANDS/ÂΜL (ref 1.85–7.62)
NEUTS SEG NFR BLD AUTO: 59 % (ref 43–75)
NRBC BLD AUTO-RTO: 0 /100 WBCS
PLATELET # BLD AUTO: 331 THOUSANDS/UL (ref 149–390)
PMV BLD AUTO: 9.4 FL (ref 8.9–12.7)
POTASSIUM SERPL-SCNC: 3.8 MMOL/L (ref 3.5–5.3)
RBC # BLD AUTO: 4.62 MILLION/UL (ref 3.88–5.62)
SODIUM SERPL-SCNC: 139 MMOL/L (ref 135–147)
WBC # BLD AUTO: 6.2 THOUSAND/UL (ref 4.31–10.16)

## 2023-10-16 PROCEDURE — 80048 BASIC METABOLIC PNL TOTAL CA: CPT | Performed by: INTERNAL MEDICINE

## 2023-10-16 PROCEDURE — 99233 SBSQ HOSP IP/OBS HIGH 50: CPT | Performed by: STUDENT IN AN ORGANIZED HEALTH CARE EDUCATION/TRAINING PROGRAM

## 2023-10-16 PROCEDURE — 85025 COMPLETE CBC W/AUTO DIFF WBC: CPT | Performed by: INTERNAL MEDICINE

## 2023-10-16 RX ORDER — POTASSIUM CHLORIDE 20 MEQ/1
20 TABLET, EXTENDED RELEASE ORAL ONCE
Status: COMPLETED | OUTPATIENT
Start: 2023-10-16 | End: 2023-10-16

## 2023-10-16 RX ADMIN — HEPARIN SODIUM 5000 UNITS: 5000 INJECTION, SOLUTION INTRAVENOUS; SUBCUTANEOUS at 05:26

## 2023-10-16 RX ADMIN — CARVEDILOL 25 MG: 25 TABLET, FILM COATED ORAL at 17:46

## 2023-10-16 RX ADMIN — CARVEDILOL 25 MG: 25 TABLET, FILM COATED ORAL at 10:38

## 2023-10-16 RX ADMIN — CEFEPIME HYDROCHLORIDE 2000 MG: 2 INJECTION, SOLUTION INTRAVENOUS at 21:44

## 2023-10-16 RX ADMIN — POTASSIUM CHLORIDE 20 MEQ: 1500 TABLET, EXTENDED RELEASE ORAL at 10:38

## 2023-10-16 RX ADMIN — PRAVASTATIN SODIUM 20 MG: 20 TABLET ORAL at 10:38

## 2023-10-16 RX ADMIN — FENOFIBRATE 145 MG: 145 TABLET ORAL at 10:38

## 2023-10-16 RX ADMIN — CEFEPIME HYDROCHLORIDE 2000 MG: 2 INJECTION, SOLUTION INTRAVENOUS at 10:38

## 2023-10-16 NOTE — ASSESSMENT & PLAN NOTE
Left middle finger erythema, pain and swelling over the past 2 to 3 days. I&D performed at urgent care on 10/15.   Warm water soaks 2-3 times daily  Encouraged improving nail bed care and avoiding fingernail biting   See mgx above

## 2023-10-16 NOTE — PROGRESS NOTES
[address] [facility]  Progress Note  Name: Mikal Suazo  MRN: [id number]  Unit/Bed#: -01 I Date of Admission: 10/15/2023   Date of Service: 10/16/2023 I Hospital Day: 0    Assessment/Plan   * Lymphangitis  Assessment & Plan  Presents due to streaking up his left arm. Seen at urgent care earlier in the day due to a paronychia of his left middle finger. I&D performed. Sent home on Keflex. Not meeting SIRS criteria  Received IV cefepime in the ER. Continue day 1      Paronychia of finger, left  Assessment & Plan  Left middle finger erythema, pain and swelling over the past 2 to 3 days. I&D performed at urgent care on 10/15. Warm water soaks 2-3 times daily  Encouraged improving nail bed care and avoiding fingernail biting   See mgx above    Essential hypertension  Assessment & Plan  Continue home carvedilol               VTE Pharmacologic Prophylaxis: VTE Score: 3 Moderate Risk (Score 3-4) - Pharmacological DVT Prophylaxis Ordered: heparin. Patient Centered Rounds: I performed bedside rounds with nursing staff today. Discussions with Specialists or Other Care Team Provider: CM    Education and Discussions with Family / Patient: Patient declined call to . Total Time Spent on Date of Encounter in care of patient: 40 mins. This time was spent on one or more of the following: performing physical exam; counseling and coordination of care; obtaining or reviewing history; documenting in the medical record; reviewing/ordering tests, medications or procedures; communicating with other healthcare professionals and discussing with patient's family/caregivers. Current Length of Stay: 0 day(s)  Current Patient Status: Observation   Certification Statement: The patient will continue to require additional inpatient hospital stay due to lymphangitis  Discharge Plan: Anticipate discharge tomorrow to home.     Code Status: Level 1 - Full Code    Subjective:   Jennifer Lamp and at bedside. No acute events overnight. Discussed plan of care. All questions and concerns were answered and addressed. Inflammation and redness and erythema has significantly improved except for at the tip of the finger. Patient feels well. Encouraged another dose of IV antibiotics for tomorrow for discharge tomorrow on either doxycycline or Levaquin    Objective:     Vitals:   Temp (24hrs), Av.9 °F (36.6 °C), Min:97.2 °F (36.2 °C), Max:98.5 °F (36.9 °C)    Temp:  [97.2 °F (36.2 °C)-98.5 °F (36.9 °C)] 97.2 °F (36.2 °C)  HR:  [65-81] 77  Resp:  [16-24] 24  BP: (147-181)/(80-99) 147/91  SpO2:  [95 %-98 %] 95 %  Body mass index is 30.52 kg/m². Input and Output Summary (last 24 hours): Intake/Output Summary (Last 24 hours) at 10/16/2023 0726  Last data filed at 10/15/2023 2206  Gross per 24 hour   Intake 50 ml   Output --   Net 50 ml       Physical Exam:   Physical Exam  Vitals and nursing note reviewed. Constitutional:       General: He is not in acute distress. Appearance: He is not ill-appearing. HENT:      Head: Normocephalic and atraumatic. Cardiovascular:      Rate and Rhythm: Normal rate and regular rhythm. Pulses: Normal pulses. Heart sounds: Normal heart sounds. Pulmonary:      Effort: Pulmonary effort is normal.      Breath sounds: Normal breath sounds. Abdominal:      General: Abdomen is flat. Bowel sounds are normal.      Palpations: Abdomen is soft. Musculoskeletal:      Right lower leg: No edema. Left lower leg: No edema. Skin:     General: Skin is warm. Findings: No erythema or rash. Neurological:      General: No focal deficit present. Mental Status: He is alert and oriented to person, place, and time.           Additional Data:     Labs:  Results from last 7 days   Lab Units 10/16/23  0415   WBC Thousand/uL 6.20   HEMOGLOBIN g/dL 13.6   HEMATOCRIT % 40.4   PLATELETS Thousands/uL 331   NEUTROS PCT % 59   LYMPHS PCT % 28   MONOS PCT % 9   EOS PCT % 3     Results from last 7 days   Lab Units 10/16/23  0415 10/15/23  2134   SODIUM mmol/L 139 138   POTASSIUM mmol/L 3.8 4.1   CHLORIDE mmol/L 107 105   CO2 mmol/L 25 27   BUN mg/dL 13 15   CREATININE mg/dL 0.74 0.83   ANION GAP mmol/L 7 6   CALCIUM mg/dL 9.5 9.7   ALBUMIN g/dL  --  4.8   TOTAL BILIRUBIN mg/dL  --  0.36   ALK PHOS U/L  --  52   ALT U/L  --  23   AST U/L  --  16   GLUCOSE RANDOM mg/dL 101 108     Results from last 7 days   Lab Units 10/15/23  2134   INR  0.96             Results from last 7 days   Lab Units 10/15/23  2134   LACTIC ACID mmol/L 0.7   PROCALCITONIN ng/ml <0.05       Lines/Drains:  Invasive Devices       Peripheral Intravenous Line  Duration             Peripheral IV 10/15/23 Right Antecubital <1 day                          Imaging: No pertinent imaging reviewed. Recent Cultures (last 7 days):         Last 24 Hours Medication List:   Current Facility-Administered Medications   Medication Dose Route Frequency Provider Last Rate    acetaminophen  650 mg Oral Q6H PRN Russ Locke PA-C      carvedilol  25 mg Oral BID Russ Locke PA-C      cefepime  2,000 mg Intravenous Q12H Mariely Escobar PA-C      fenofibrate  145 mg Oral Daily Mariely Escobar PA-C      heparin (porcine)  5,000 Units Subcutaneous Q8H [address] Mariely Escobar PA-C      ondansetron  4 mg Intravenous Q6H PRN Mariely Escobar PA-C      potassium chloride  20 mEq Oral Once Teena Max MD      pravastatin  20 mg Oral Daily Russ Locke PA-C          Today, Patient Was Seen By: Teena Max MD    **Please Note: This note may have been constructed using a voice recognition system. **

## 2023-10-16 NOTE — ASSESSMENT & PLAN NOTE
Presents due to streaking up his left arm. Seen at urgent care earlier in the day due to a paronychia of his left middle finger. I&D performed. Sent home on Keflex. Not meeting SIRS criteria  Received IV cefepime in the ER.   Continue day 1

## 2023-10-16 NOTE — ASSESSMENT & PLAN NOTE
Presents due to streaking up his left arm. Seen at urgent care earlier in the day due to a paronychia of his left middle finger. I&D performed. Sent home on Keflex. Not meeting SIRS criteria  Received IV cefepime in the ER. Continue.   Consider transitioning to oral antibiotics tomorrow

## 2023-10-16 NOTE — ED PROVIDER NOTES
History  Chief Complaint   Patient presents with    Finger Swelling     Pt reports left hand middle finger swelling and redness x 2-3 days. Was seen at  this morning, the finger was lanced open and he started an abx today but about an hour ago noticed a red line going up his forearm. This is a 60 y/o male with PMH HTN, HLD who presents to the ER today for red streaking up his left arm. Patient reports that he had redness, swelling and pus around his left middle finger for the past 2-3 days. Went to urgent care this morning where they did an I&D and started him on keflex. Patient states he noticed later in the day a red streak going up his arm from his finger. Also admits to increased fatigue lately. He denies any arm pain, fevers, chills, chest pain, shortness of breath. Took one dose of keflex this morning. Denies ever having this occur before, but has had paronychias before. Admits to biting his nails. No known allergies to medications. History provided by:  Patient   used: No        Prior to Admission Medications   Prescriptions Last Dose Informant Patient Reported?  Taking?   carvedilol (COREG) 25 mg tablet 10/15/2023  Yes Yes   Sig: Take 25 mg by mouth 2 (two) times a day   cephalexin (KEFLEX) 500 mg capsule 10/15/2023  No Yes   Sig: Take 1 capsule (500 mg total) by mouth every 12 (twelve) hours for 7 days   fenofibrate (TRIGLIDE) 160 MG tablet 10/14/2023 Self Yes Yes   Sig: Take 160 mg by mouth daily   pravastatin (PRAVACHOL) 20 mg tablet 10/15/2023 Self Yes Yes   Sig: Take 20 mg by mouth daily      Facility-Administered Medications: None       Past Medical History:   Diagnosis Date    Hyperlipidemia     Hypertension        Past Surgical History:   Procedure Laterality Date    KNEE ARTHROSCOPY W/ MENISCAL REPAIR Left 9/14/2022    Procedure: medial meniscectomy;  Surgeon: Sim Anne DO;  Location:  MAIN OR;  Service: Orthopedics    SHOULDER SURGERY Bilateral     rotator cuff repair       Family History   Problem Relation Age of Onset    Hypertension Mother     Glaucoma Father     Hyperlipidemia Father     Hypertension Father      I have reviewed and agree with the history as documented. E-Cigarette/Vaping    E-Cigarette Use Never User      E-Cigarette/Vaping Substances     Social History     Tobacco Use    Smoking status: Never    Smokeless tobacco: Never   Vaping Use    Vaping Use: Never used   Substance Use Topics    Alcohol use: Never     Comment: occassional wine maybe once a month    Drug use: Never       Review of Systems   Constitutional:  Positive for fatigue. Negative for chills and fever. Respiratory:  Negative for shortness of breath. Cardiovascular:  Negative for chest pain. Gastrointestinal:  Negative for nausea and vomiting. Skin:  Positive for color change. Psychiatric/Behavioral:  Negative for behavioral problems and sleep disturbance. All other systems reviewed and are negative. Physical Exam  Physical Exam  Vitals and nursing note reviewed. Constitutional:       General: He is awake. Appearance: Normal appearance. He is well-developed. HENT:      Head: Normocephalic and atraumatic. Right Ear: External ear normal.      Left Ear: External ear normal.      Nose: Nose normal.   Eyes:      General: No scleral icterus. Extraocular Movements: Extraocular movements intact. Cardiovascular:      Rate and Rhythm: Normal rate and regular rhythm. Heart sounds: Normal heart sounds, S1 normal and S2 normal. No murmur heard. No gallop. Pulmonary:      Effort: Pulmonary effort is normal.      Breath sounds: Normal breath sounds. No wheezing, rhonchi or rales. Musculoskeletal:         General: Normal range of motion. Cervical back: Normal range of motion. Skin:     General: Skin is warm and dry. Findings: Erythema present.       Comments: Paronychia of left middle finger with erythema on dorsal aspect of hand then red streaking up forearm into upper arm area. See photos below. Neurological:      General: No focal deficit present. Mental Status: He is alert. Psychiatric:         Attention and Perception: Attention and perception normal.         Mood and Affect: Mood normal.         Behavior: Behavior normal. Behavior is cooperative.                Vital Signs  ED Triage Vitals [10/15/23 1712]   Temperature Pulse Respirations Blood Pressure SpO2   98.5 °F (36.9 °C) 81 18 (!) 181/91 98 %      Temp Source Heart Rate Source Patient Position - Orthostatic VS BP Location FiO2 (%)   Temporal Monitor -- Left arm --      Pain Score       No Pain           Vitals:    10/15/23 1712 10/15/23 2100 10/15/23 2308 10/15/23 2341   BP: (!) 181/91 150/80 162/87 157/99   Pulse: 81 67 65 67         Visual Acuity      ED Medications  Medications   carvedilol (COREG) tablet 25 mg (has no administration in time range)   pravastatin (PRAVACHOL) tablet 20 mg (has no administration in time range)   fenofibrate (TRICOR) tablet 145 mg (has no administration in time range)   acetaminophen (TYLENOL) tablet 650 mg (has no administration in time range)   ondansetron (ZOFRAN) injection 4 mg (has no administration in time range)   heparin (porcine) subcutaneous injection 5,000 Units (has no administration in time range)   cefepime (MAXIPIME) IVPB (premix in dextrose) 2,000 mg 50 mL (has no administration in time range)   cefepime (MAXIPIME) IVPB (premix in dextrose) 2,000 mg 50 mL (0 mg Intravenous Stopped 10/15/23 2206)       Diagnostic Studies  Results Reviewed       Procedure Component Value Units Date/Time    Procalcitonin [[id number]]  (Normal) Collected: 10/15/23 2134    Lab Status: Final result Specimen: Blood from Arm, Right Updated: 10/15/23 2209     Procalcitonin <0.05 ng/ml     Lactic acid [[id number]]  (Normal) Collected: 10/15/23 2134    Lab Status: Final result Specimen: Blood from Arm, Right Updated: 10/15/23 2201     LACTIC ACID 0.7 mmol/L     Narrative:      Result may be elevated if tourniquet was used during collection.     Comprehensive metabolic panel [[id number]] Collected: 10/15/23 2134    Lab Status: Final result Specimen: Blood from Arm, Right Updated: 10/15/23 2159     Sodium 138 mmol/L      Potassium 4.1 mmol/L      Chloride 105 mmol/L      CO2 27 mmol/L      ANION GAP 6 mmol/L      BUN 15 mg/dL      Creatinine 0.83 mg/dL      Glucose 108 mg/dL      Calcium 9.7 mg/dL      AST 16 U/L      ALT 23 U/L      Alkaline Phosphatase 52 U/L      Total Protein 7.2 g/dL      Albumin 4.8 g/dL      Total Bilirubin 0.36 mg/dL      eGFR 95 ml/min/1.73sq m     Narrative:      [facility] guidelines for Chronic Kidney Disease (CKD):     Stage 1 with normal or high GFR (GFR > 90 mL/min/1.73 square meters)    Stage 2 Mild CKD (GFR = 60-89 mL/min/1.73 square meters)    Stage 3A Moderate CKD (GFR = 45-59 mL/min/1.73 square meters)    Stage 3B Moderate CKD (GFR = 30-44 mL/min/1.73 square meters)    Stage 4 Severe CKD (GFR = 15-29 mL/min/1.73 square meters)    Stage 5 End Stage CKD (GFR <15 mL/min/1.73 square meters)  Note: GFR calculation is accurate only with a steady state creatinine    Protime-INR [[id number]]  (Normal) Collected: 10/15/23 2134    Lab Status: Final result Specimen: Blood from Arm, Right Updated: 10/15/23 2156     Protime 13.2 seconds      INR 0.96    APTT [[id number]]  (Normal) Collected: 10/15/23 2134    Lab Status: Final result Specimen: Blood from Arm, Right Updated: 10/15/23 2156     PTT 30 seconds     CBC and differential [[id number]] Collected: 10/15/23 2134    Lab Status: Final result Specimen: Blood from Arm, Right Updated: 10/15/23 2144     WBC 7.09 Thousand/uL      RBC 4.72 Million/uL      Hemoglobin 14.0 g/dL      Hematocrit 41.5 %      MCV 88 fL      MCH 29.7 pg      MCHC 33.7 g/dL      RDW 12.1 %      MPV 9.4 fL      Platelets 887 Thousands/uL      nRBC 0 /100 WBCs      Neutrophils Relative 56 %      Immat GRANS % 1 %      Lymphocytes Relative 30 %      Monocytes Relative 9 %      Eosinophils Relative 3 %      Basophils Relative 1 %      Neutrophils Absolute 4.07 Thousands/µL      Immature Grans Absolute 0.04 Thousand/uL      Lymphocytes Absolute 2.09 Thousands/µL      Monocytes Absolute 0.62 Thousand/µL      Eosinophils Absolute 0.22 Thousand/µL      Basophils Absolute 0.05 Thousands/µL     Blood culture #2 [[id number]] Collected: 10/15/23 2134    Lab Status: In process Specimen: Blood from Arm, Left Updated: 10/15/23 2142    Blood culture #1 [[id number]] Collected: 10/15/23 2134    Lab Status: In process Specimen: Blood from Arm, Right Updated: 10/15/23 2142                   No orders to display              Procedures  Procedures         ED Course  ED Course as of 10/15/23 2346   Sun Oct 15, 2023   2230 TT SLIM             Medical Decision Making  60 y/o male here for red streaking up arm  Differential diagnosis including but not limited to: paronychia, cellulitis, erysipelas, lymphangitis, at risk for sepsis  Assessment: paronychia of finger of left hand, lymphangitis complicated by chronic conditions of hypertension and hyperlipidemia  Plan: IV cefepime, admit for lymphangitis on IV abx and monitor labs and vital signs. Discussed benefits of hospitalization with patient and he agreed with this plan. Discussed pertinent H&P, lab results with hospitalist team and patient was accepted by Benny Orellana to the internal medicine service under Dr Colette Gallo. Amount and/or Complexity of Data Reviewed  Labs: ordered. Risk  Prescription drug management. Decision regarding hospitalization.              Disposition  Final diagnoses:   Paronychia of finger of left hand   Lymphangitis   Hypertension   Hyperlipidemia     Time reflects when diagnosis was documented in both MDM as applicable and the Disposition within this note       Time User Action Codes Description Comment    10/15/2023 10:53 PM Radha Florian [G33.798] Paronychia of finger of left hand     10/15/2023 10:54 PM Coby Chicas Add [I89.1] Lymphangitis     10/15/2023 10:54 PM Coby Chicas Add [I10] Hypertension     10/15/2023 11:45 PM Coby Chicas Add [E78.5] Hyperlipidemia           ED Disposition       ED Disposition   Admit    Condition   Stable    Date/Time   Sun Oct 15, 2023 10:53 PM    Comment   Case was discussed with Arnulfo Nolasco and the patient's admission status was agreed to be Admission Status: observation status to the service of Dr. Hoang Mcginnis . Follow-up Information    None         Current Discharge Medication List        CONTINUE these medications which have NOT CHANGED    Details   carvedilol (COREG) 25 mg tablet Take 25 mg by mouth 2 (two) times a day      cephalexin (KEFLEX) 500 mg capsule Take 1 capsule (500 mg total) by mouth every 12 (twelve) hours for 7 days  Qty: 14 capsule, Refills: 0    Associated Diagnoses: Paronychia of finger of left hand      fenofibrate (TRIGLIDE) 160 MG tablet Take 160 mg by mouth daily      pravastatin (PRAVACHOL) 20 mg tablet Take 20 mg by mouth daily             No discharge procedures on file.     PDMP Review       None            ED Provider  Electronically Signed by             Afsaneh Fountain PA-C  10/15/23 0836

## 2023-10-16 NOTE — H&P
[address] [facility]  H&P  Name: Jayda Mcgraw 59 y.o. male I MRN: [id number]  Unit/Bed#: -01 I Date of Admission: 10/15/2023   Date of Service: 10/16/2023 I Hospital Day: 0      Assessment/Plan   * Lymphangitis  Assessment & Plan  Presents due to streaking up his left arm. Seen at urgent care earlier in the day due to a paronychia of his left middle finger. I&D performed. Sent home on Keflex. Not meeting SIRS criteria  Received IV cefepime in the ER. Continue. Consider transitioning to oral antibiotics tomorrow            Paronychia of finger, left  Assessment & Plan  Left middle finger erythema, pain and swelling over the past 2 to 3 days. I&D performed at urgent care on 10/15. Started on IV cefepime. Transition to oral antibiotics when appropriate  Warm water soaks 2-3 times daily  Encouraged improving nail bed care and avoiding fingernail biting     Essential hypertension  Assessment & Plan  Continue home carvedilol         VTE Pharmacologic Prophylaxis: VTE Score: 3 Moderate Risk (Score 3-4) - Pharmacological DVT Prophylaxis Ordered: heparin. Code Status: Level 1 - Full Code   Discussion with family: Updated  (wife) at bedside. Anticipated Length of Stay: Patient will be admitted on an observation basis with an anticipated length of stay of less than 2 midnights secondary to lymphangitis . Total Time Spent on Date of Encounter in care of patient: 55 mins. This time was spent on one or more of the following: performing physical exam; counseling and coordination of care; obtaining or reviewing history; documenting in the medical record; reviewing/ordering tests, medications or procedures; communicating with other healthcare professionals and discussing with patient's family/caregivers. Chief Complaint: red line up my arm     History of Present Illness:  Jayda Mcgraw is a 59 y.o. male with a PMH of HTN, obesity who presents with streaking up his left arm. Seen at urgent care earlier in the day due to paronychia of his left middle finger. I&D performed and sent home with keflex. He took one dose of keflex. At home later in the day noticed a red line traveling up his left arm. Denies fevers or chills. Not meeting sirs criteria. Started on IV abx in the er. Review of Systems:  Review of Systems   Constitutional:  Negative for fatigue and fever. HENT:  Negative for sore throat. Respiratory:  Negative for cough, chest tightness and shortness of breath. Cardiovascular:  Negative for chest pain. Gastrointestinal:  Negative for abdominal distention, abdominal pain, diarrhea, nausea and vomiting. Genitourinary:  Negative for difficulty urinating. Musculoskeletal:  Negative for arthralgias. Skin:  Positive for color change and wound. Neurological:  Negative for weakness and headaches. Psychiatric/Behavioral:  Negative for agitation and behavioral problems. All other systems reviewed and are negative. Past Medical and Surgical History:   Past Medical History:   Diagnosis Date    Hyperlipidemia     Hypertension        Past Surgical History:   Procedure Laterality Date    KNEE ARTHROSCOPY W/ MENISCAL REPAIR Left 9/14/2022    Procedure: medial meniscectomy;  Surgeon: Sofi Mcallister DO;  Location:  MAIN OR;  Service: Orthopedics    SHOULDER SURGERY Bilateral     rotator cuff repair       Meds/Allergies:  Prior to Admission medications    Medication Sig Start Date End Date Taking?  Authorizing Provider   aspirin (ECOTRIN) 325 mg EC tablet Take 1 tablet (325 mg total) by mouth daily  Patient not taking: Reported on 10/15/2023 9/14/22   Scott Humphrey PA-C   carvedilol (COREG) 12.5 mg tablet Take 12.5 mg by mouth 2 (two) times a day with meals  Patient not taking: Reported on 10/15/2023    Historical Provider, MD   carvedilol (COREG) 25 mg tablet Take 25 mg by mouth 2 (two) times a day 8/3/23   Historical Provider, MD   cephalexin (KEFLEX) 500 mg capsule Take 1 capsule (500 mg total) by mouth every 12 (twelve) hours for 7 days 10/15/23 10/22/23  MONTRELL Oates   fenofibrate (TRIGLIDE) 160 MG tablet Take 160 mg by mouth daily    Historical Provider, MD   oxyCODONE (Roxicodone) 5 immediate release tablet Take 1 tablet (5 mg total) by mouth every 4 (four) hours as needed for moderate pain for up to 15 doses Max Daily Amount: 30 mg  Patient not taking: Reported on 9/29/2022 9/14/22   Matthew Morel PA-C   pravastatin (PRAVACHOL) 20 mg tablet Take 20 mg by mouth daily    Historical Provider, MD     I have reviewed home medications with patient personally. Allergies: No Known Allergies    Social History:  Marital Status: /Civil Union   Occupation: unknown   Patient Pre-hospital Living Situation: Home  Patient Pre-hospital Level of Mobility: walks  Patient Pre-hospital Diet Restrictions: regular   Substance Use History:   Social History     Substance and Sexual Activity   Alcohol Use Never    Comment: occassional wine maybe once a month     Social History     Tobacco Use   Smoking Status Never   Smokeless Tobacco Never     Social History     Substance and Sexual Activity   Drug Use Never       Family History:  Family History   Problem Relation Age of Onset    Hypertension Mother     Glaucoma Father     Hyperlipidemia Father     Hypertension Father        Physical Exam:     Vitals:   Blood Pressure: 157/99 (10/15/23 2341)  Pulse: 67 (10/15/23 2341)  Temperature: 98.5 °F (36.9 °C) (10/15/23 1712)  Temp Source: Temporal (10/15/23 1712)  Respirations: 16 (10/15/23 2308)  Height: 6' (182.9 cm) (10/15/23 1712)  Weight - Scale: 102 kg (225 lb) (10/15/23 1712)  SpO2: 97 % (10/15/23 2341)    Physical Exam  Vitals and nursing note reviewed. Constitutional:       Appearance: Normal appearance. He is obese. HENT:      Head: Normocephalic. Eyes:      Extraocular Movements: Extraocular movements intact.       Pupils: Pupils are equal, round, and reactive to light. Cardiovascular:      Rate and Rhythm: Normal rate and regular rhythm. Heart sounds: No murmur heard. No gallop. Pulmonary:      Effort: No respiratory distress. Breath sounds: Normal breath sounds. No wheezing. Abdominal:      General: Bowel sounds are normal. There is no distension. Tenderness: There is no abdominal tenderness. Musculoskeletal:         General: Normal range of motion. Cervical back: Normal range of motion. Skin:     General: Skin is warm. Comments: L middle finger redness  Red streak traveling up left arm (lymphangitis)    Neurological:      General: No focal deficit present. Mental Status: He is alert and oriented to person, place, and time. Mental status is at baseline. Psychiatric:         Mood and Affect: Mood normal.         Behavior: Behavior normal.         Thought Content: Thought content normal.          Additional Data:     Lab Results:  Results from last 7 days   Lab Units 10/15/23  2134   WBC Thousand/uL 7.09   HEMOGLOBIN g/dL 14.0   HEMATOCRIT % 41.5   PLATELETS Thousands/uL 321   NEUTROS PCT % 56   LYMPHS PCT % 30   MONOS PCT % 9   EOS PCT % 3     Results from last 7 days   Lab Units 10/15/23  2134   SODIUM mmol/L 138   POTASSIUM mmol/L 4.1   CHLORIDE mmol/L 105   CO2 mmol/L 27   BUN mg/dL 15   CREATININE mg/dL 0.83   ANION GAP mmol/L 6   CALCIUM mg/dL 9.7   ALBUMIN g/dL 4.8   TOTAL BILIRUBIN mg/dL 0.36   ALK PHOS U/L 52   ALT U/L 23   AST U/L 16   GLUCOSE RANDOM mg/dL 108     Results from last 7 days   Lab Units 10/15/23  2134   INR  0.96             Results from last 7 days   Lab Units 10/15/23  2134   LACTIC ACID mmol/L 0.7   PROCALCITONIN ng/ml <0.05       Lines/Drains:  Invasive Devices       Peripheral Intravenous Line  Duration             Peripheral IV 10/15/23 Right Antecubital <1 day                        Imaging: No pertinent imaging reviewed.   No orders to display       EKG and Other Studies Reviewed on Admission:   EKG: No EKG obtained. ** Please Note: This note has been constructed using a voice recognition system.  **

## 2023-10-16 NOTE — CASE MANAGEMENT
Case Management Discharge Planning Note    Patient name Alfredo Villegas  Location [address] [facility] 225/-57 MRN [id number]  : 1965 Date 10/16/2023       Current Admission Date: 10/15/2023  Current Admission Diagnosis:Lymphangitis   Patient Active Problem List    Diagnosis Date Noted    Paronychia of finger, left 10/15/2023    Lymphangitis 10/15/2023    Essential hypertension 10/15/2023    Complex tear of medial meniscus of left knee as current injury 2022      LOS (days): 0  Geometric Mean LOS (GMLOS) (days):   Days to GMLOS:     OBJECTIVE:            Current admission status: Observation   Preferred Pharmacy:   [id number] [place], [address] [place], [place] [id number]  Phone: [phone] Fax: [phone]    Primary Care Provider: Checo Orellana MD    Primary Insurance: BLUE CROSS  Secondary Insurance:     DISCHARGE DETAILS:        Patient is indp and has no anticipated needs at discharge. Discharge to home w/ spouse is anticipated for tomorrow. Spouse will transport.

## 2023-10-16 NOTE — UTILIZATION REVIEW
Initial Clinical Review    Admission: Date/Time/Statement:   Admission Orders (From admission, onward)       Ordered        10/15/23 8577  Place in Observation  Once                          Orders Placed This Encounter   Procedures    Place in Observation     Standing Status:   Standing     Number of Occurrences:   1     Order Specific Question:   Level of Care     Answer:   Med Surg [16]     ED Arrival Information       Expected   -    Arrival   10/15/2023 16:43    Acuity   Urgent              Means of arrival   Walk-In    Escorted by   Spouse    Service   Hospitalist    Admission type   Emergency              Arrival complaint   Finger infection             Chief Complaint   Patient presents with    Finger Swelling     Pt reports left hand middle finger swelling and redness x 2-3 days. Was seen at  this morning, the finger was lanced open and he started an abx today but about an hour ago noticed a red line going up his forearm. Initial Presentation: 59 y.o. male presents to ED from home with streaking noted up  left arm. Seen at  Urgent  Care the morning of admission with paronychia left middle finger. I & D  performed and sent home with po kefllex. Took 1 dose  keflex. Later  in the day noticed streaking up left arm. PMH is  HTN. Not meeting SIRS  criteria. Admit  Observation with  Lymphangitis and plan  is MAYA   and continue home meds.                 ED Triage Vitals   Temperature Pulse Respirations Blood Pressure SpO2   10/15/23 1712 10/15/23 1712 10/15/23 1712 10/15/23 1712 10/15/23 1712   98.5 °F (36.9 °C) 81 18 (!) 181/91 98 %      Temp Source Heart Rate Source Patient Position - Orthostatic VS BP Location FiO2 (%)   10/15/23 1712 10/15/23 1712 10/16/23 0705 10/15/23 1712 --   Temporal Monitor Lying Left arm       Pain Score       10/15/23 1712       No Pain          Wt Readings from Last 1 Encounters:   10/15/23 102 kg (225 lb)     Additional Vital Signs:   7.2 °F (36.2 °C) Abnormal  77 24 Abnormal  147/91 110 95 % None (Room air) Lying    10/15/23 23:41:01 -- 67 -- 157/99 118 97 % None (Room air) --   10/15/23 2308 -- 65 16 162/87 -- 96 % None (Room air) --   10/15/23 2100 -- 67 16 150/80 109 96 % None (Room air) --   10/15/23 1712 98.5 °F (36.9 °C) 81 18 181/91 Abnormal  -- 98 % None (Room air) --     Pertinent Labs/Diagnostic Test Results:       Results from last 7 days   Lab Units 10/16/23  0415 10/15/23  2134   WBC Thousand/uL 6.20 7.09   HEMOGLOBIN g/dL 13.6 14.0   HEMATOCRIT % 40.4 41.5   PLATELETS Thousands/uL 331 321   NEUTROS ABS Thousands/µL 3.62 4.07         Results from last 7 days   Lab Units 10/16/23  0415 10/15/23  2134   SODIUM mmol/L 139 138   POTASSIUM mmol/L 3.8 4.1   CHLORIDE mmol/L 107 105   CO2 mmol/L 25 27   ANION GAP mmol/L 7 6   BUN mg/dL 13 15   CREATININE mg/dL 0.74 0.83   EGFR ml/min/1.73sq m 100 95   CALCIUM mg/dL 9.5 9.7     Results from last 7 days   Lab Units 10/15/23  2134   AST U/L 16   ALT U/L 23   ALK PHOS U/L 52   TOTAL PROTEIN g/dL 7.2   ALBUMIN g/dL 4.8   TOTAL BILIRUBIN mg/dL 0.36         Results from last 7 days   Lab Units 10/16/23  0415 10/15/23  2134   GLUCOSE RANDOM mg/dL 101 108               Results from last 7 days   Lab Units 10/15/23  2134   PROTIME seconds 13.2   INR  0.96   PTT seconds 30         Results from last 7 days   Lab Units 10/15/23  2134   PROCALCITONIN ng/ml <0.05     Results from last 7 days   Lab Units 10/15/23  2134   LACTIC ACID mmol/L 0.7           Results from last 7 days   Lab Units 10/15/23  2134   BLOOD CULTURE  Received in Microbiology Lab. Culture in Progress. Received in Microbiology Lab. Culture in Progress.        ED Treatment:   Medication Administration from 10/15/2023 1643 to 10/15/2023 2317         Date/Time Order Dose Route Action Comments     10/15/2023 2206 EDT cefepime (MAXIPIME) IVPB (premix in dextrose) 2,000 mg 50 mL 0 mg Intravenous Stopped --     10/15/2023 2136 EDT cefepime (MAXIPIME) IVPB (premix in dextrose) 2,000 mg 50 mL 2,000 mg Intravenous New Bag --              Admitting Diagnosis: Lymphangitis [I89.1]  Hypertension [I10]  Paronychia of finger of left hand [P84.730]  Age/Sex: 59 y.o. male  Admission Orders:  Scheduled Medications:  carvedilol, 25 mg, Oral, BID  cefepime, 2,000 mg, Intravenous, Q12H  fenofibrate, 145 mg, Oral, Daily  heparin (porcine), 5,000 Units, Subcutaneous, Q8H [address]  potassium chloride, 20 mEq, Oral, Once  pravastatin, 20 mg, Oral, Daily      Continuous IV Infusions:     PRN Meds:  acetaminophen, 650 mg, Oral, Q6H PRN  ondansetron, 4 mg, Intravenous, Q6H PRN          Network Utilization Review Department  ATTENTION: Please call with any questions or concerns to [phone] and carefully listen to the prompts so that you are directed to the right person. All voicemails are confidential.   For Discharge needs, contact Care Management DC Support Team at [phone] opt. 2  Send all requests for admission clinical reviews, approved or denied determinations and any other requests to dedicated fax number below belonging to the campus where the patient is receiving treatment.  List of dedicated fax numbers for the Facilities:  [place] DENIALS (Administrative/Medical Necessity) [phone]   DISCHARGE SUPPORT TEAM (NETWORK) [address] (Maternity/NICU/Pediatrics) [phone]   [address] [place] [address] Drive [phone]   [facility] [address] [facility] [phone]   [address] [address] [facility] [phone]   [address] [facility] [address] [phone]   [address] [place] [address] [address] [phone]

## 2023-10-16 NOTE — PLAN OF CARE
Problem: PAIN - ADULT  Goal: Verbalizes/displays adequate comfort level or baseline comfort level  Description: Interventions:  - Encourage patient to monitor pain and request assistance  - Assess pain using appropriate pain scale  - Administer analgesics based on type and severity of pain and evaluate response  - Implement non-pharmacological measures as appropriate and evaluate response  - Consider cultural and social influences on pain and pain management  - Notify physician/advanced practitioner if interventions unsuccessful or patient reports new pain  Outcome: Progressing     Problem: INFECTION - ADULT  Goal: Absence or prevention of progression during hospitalization  Description: INTERVENTIONS:  - Assess and monitor for signs and symptoms of infection  - Monitor lab/diagnostic results  - Monitor all insertion sites, i.e. indwelling lines, tubes, and drains  - Monitor endotracheal if appropriate and nasal secretions for changes in amount and color  - Summers appropriate cooling/warming therapies per order  - Administer medications as ordered  - Instruct and encourage patient and family to use good hand hygiene technique  - Identify and instruct in appropriate isolation precautions for identified infection/condition  Outcome: Progressing  Goal: Absence of fever/infection during neutropenic period  Description: INTERVENTIONS:  - Monitor WBC    Outcome: Progressing     Problem: SAFETY ADULT  Goal: Patient will remain free of falls  Description: INTERVENTIONS:  - Educate patient/family on patient safety including physical limitations  - Instruct patient to call for assistance with activity   - Consult OT/PT to assist with strengthening/mobility   - Keep Call bell within reach  - Keep bed low and locked with side rails adjusted as appropriate  - Keep care items and personal belongings within reach  - Initiate and maintain comfort rounds  - Make Fall Risk Sign visible to staff  - Offer Toileting every 2 Hours, in advance of need  - Initiate/Maintain alarm  - Obtain necessary fall risk management equipment:   - Apply yellow socks and bracelet for high fall risk patients  - Consider moving patient to room near nurses station  Outcome: Progressing  Goal: Maintain or return to baseline ADL function  Description: INTERVENTIONS:  -  Assess patient's ability to carry out ADLs; assess patient's baseline for ADL function and identify physical deficits which impact ability to perform ADLs (bathing, care of mouth/teeth, toileting, grooming, dressing, etc.)  - Assess/evaluate cause of self-care deficits   - Assess range of motion  - Assess patient's mobility; develop plan if impaired  - Assess patient's need for assistive devices and provide as appropriate  - Encourage maximum independence but intervene and supervise when necessary  - Involve family in performance of ADLs  - Assess for home care needs following discharge   - Consider OT consult to assist with ADL evaluation and planning for discharge  - Provide patient education as appropriate  Outcome: Progressing  Goal: Maintains/Returns to pre admission functional level  Description: INTERVENTIONS:  - Perform BMAT or MOVE assessment daily.   - Set and communicate daily mobility goal to care team and patient/family/caregiver. - Collaborate with rehabilitation services on mobility goals if consulted  - Perform Range of Motion 2 times a day. - Reposition patient every 2 hours.   - Dangle patient 2 times a day  - Stand patient 2 times a day  - Ambulate patient 2 times a day  - Out of bed to chair 2 times a day   - Out of bed for meals 2 times a day  - Out of bed for toileting  - Record patient progress and toleration of activity level   Outcome: Progressing     Problem: DISCHARGE PLANNING  Goal: Discharge to home or other facility with appropriate resources  Description: INTERVENTIONS:  - Identify barriers to discharge w/patient and caregiver  - Arrange for needed discharge resources and transportation as appropriate  - Identify discharge learning needs (meds, wound care, etc.)  - Arrange for interpretive services to assist at discharge as needed  - Refer to Case Management Department for coordinating discharge planning if the patient needs post-hospital services based on physician/advanced practitioner order or complex needs related to functional status, cognitive ability, or social support system  Outcome: Progressing     Problem: Knowledge Deficit  Goal: Patient/family/caregiver demonstrates understanding of disease process, treatment plan, medications, and discharge instructions  Description: Complete learning assessment and assess knowledge base.   Interventions:  - Provide teaching at level of understanding  - Provide teaching via preferred learning methods  Outcome: Progressing

## 2023-10-16 NOTE — ASSESSMENT & PLAN NOTE
Presents due to streaking up his left arm. Seen at urgent care earlier in the day due to a paronychia of his left middle finger. I&D performed. Sent home on Keflex. Not meeting SIRS criteria  Received IV cefepime in the ER.   Continue day 2-> will discharge on Levaquin for 5 days and florastor for 8 days  BC NGTD @ 24h

## 2023-10-16 NOTE — ASSESSMENT & PLAN NOTE
Left middle finger erythema, pain and swelling over the past 2 to 3 days. I&D performed at urgent care on 10/15. Started on IV cefepime.   Transition to oral antibiotics when appropriate  Warm water soaks 2-3 times daily  Encouraged improving nail bed care and avoiding fingernail biting

## 2023-10-17 VITALS
TEMPERATURE: 97 F | HEIGHT: 72 IN | RESPIRATION RATE: 18 BRPM | BODY MASS INDEX: 30.48 KG/M2 | SYSTOLIC BLOOD PRESSURE: 142 MMHG | WEIGHT: 225 LBS | DIASTOLIC BLOOD PRESSURE: 94 MMHG | HEART RATE: 70 BPM | OXYGEN SATURATION: 95 %

## 2023-10-17 PROBLEM — I89.1 LYMPHANGITIS: Status: RESOLVED | Noted: 2023-10-15 | Resolved: 2023-10-17

## 2023-10-17 LAB
ANION GAP SERPL CALCULATED.3IONS-SCNC: 6 MMOL/L
BUN SERPL-MCNC: 15 MG/DL (ref 5–25)
CALCIUM SERPL-MCNC: 9.5 MG/DL (ref 8.4–10.2)
CHLORIDE SERPL-SCNC: 105 MMOL/L (ref 96–108)
CO2 SERPL-SCNC: 27 MMOL/L (ref 21–32)
CREAT SERPL-MCNC: 0.83 MG/DL (ref 0.6–1.3)
ERYTHROCYTE [DISTWIDTH] IN BLOOD BY AUTOMATED COUNT: 12 % (ref 11.6–15.1)
GFR SERPL CREATININE-BSD FRML MDRD: 95 ML/MIN/1.73SQ M
GLUCOSE P FAST SERPL-MCNC: 95 MG/DL (ref 65–99)
GLUCOSE SERPL-MCNC: 95 MG/DL (ref 65–140)
HCT VFR BLD AUTO: 40.5 % (ref 36.5–49.3)
HGB BLD-MCNC: 13.7 G/DL (ref 12–17)
MAGNESIUM SERPL-MCNC: 2 MG/DL (ref 1.9–2.7)
MCH RBC QN AUTO: 29.6 PG (ref 26.8–34.3)
MCHC RBC AUTO-ENTMCNC: 33.8 G/DL (ref 31.4–37.4)
MCV RBC AUTO: 88 FL (ref 82–98)
PLATELET # BLD AUTO: 336 THOUSANDS/UL (ref 149–390)
PMV BLD AUTO: 9.6 FL (ref 8.9–12.7)
POTASSIUM SERPL-SCNC: 4.1 MMOL/L (ref 3.5–5.3)
RBC # BLD AUTO: 4.63 MILLION/UL (ref 3.88–5.62)
SODIUM SERPL-SCNC: 138 MMOL/L (ref 135–147)
WBC # BLD AUTO: 5.47 THOUSAND/UL (ref 4.31–10.16)

## 2023-10-17 PROCEDURE — 85027 COMPLETE CBC AUTOMATED: CPT | Performed by: STUDENT IN AN ORGANIZED HEALTH CARE EDUCATION/TRAINING PROGRAM

## 2023-10-17 PROCEDURE — 99239 HOSP IP/OBS DSCHRG MGMT >30: CPT | Performed by: STUDENT IN AN ORGANIZED HEALTH CARE EDUCATION/TRAINING PROGRAM

## 2023-10-17 PROCEDURE — 80048 BASIC METABOLIC PNL TOTAL CA: CPT | Performed by: STUDENT IN AN ORGANIZED HEALTH CARE EDUCATION/TRAINING PROGRAM

## 2023-10-17 PROCEDURE — 83735 ASSAY OF MAGNESIUM: CPT | Performed by: STUDENT IN AN ORGANIZED HEALTH CARE EDUCATION/TRAINING PROGRAM

## 2023-10-17 RX ORDER — SACCHAROMYCES BOULARDII 250 MG
250 CAPSULE ORAL 2 TIMES DAILY
Qty: 16 CAPSULE | Refills: 0 | Status: SHIPPED | OUTPATIENT
Start: 2023-10-17 | End: 2023-10-25

## 2023-10-17 RX ORDER — LEVOFLOXACIN 750 MG/1
750 TABLET ORAL EVERY 24 HOURS
Qty: 5 TABLET | Refills: 0 | Status: SHIPPED | OUTPATIENT
Start: 2023-10-17 | End: 2023-10-22

## 2023-10-17 RX ADMIN — CARVEDILOL 25 MG: 25 TABLET, FILM COATED ORAL at 09:23

## 2023-10-17 RX ADMIN — PRAVASTATIN SODIUM 20 MG: 20 TABLET ORAL at 09:23

## 2023-10-17 RX ADMIN — CEFEPIME HYDROCHLORIDE 2000 MG: 2 INJECTION, SOLUTION INTRAVENOUS at 09:23

## 2023-10-17 RX ADMIN — FENOFIBRATE 145 MG: 145 TABLET ORAL at 09:23

## 2023-10-17 NOTE — DISCHARGE SUMMARY
[address]  Discharge- Bhumi Vila 1965, 59 y.o. male MRN: [id number]  Unit/Bed#: [facility]-01 Encounter: [id number]  Primary Care Provider: Camelia Mtz MD   Date and time admitted to hospital: 10/15/2023  8:39 PM    * Lymphangitis-resolved as of 10/17/2023  Assessment & Plan  Presents due to streaking up his left arm. Seen at urgent care earlier in the day due to a paronychia of his left middle finger. I&D performed. Sent home on Keflex. Not meeting SIRS criteria  Received IV cefepime in the ER. Continue day 2-> will discharge on Levaquin for 5 days and florastor for 8 days  BC NGTD @ 24h      Paronychia of finger, left  Assessment & Plan  Left middle finger erythema, pain and swelling over the past 2 to 3 days. I&D performed at urgent care on 10/15.   Warm water soaks 2-3 times daily  Encouraged improving nail bed care and avoiding fingernail biting   See mgx above    Essential hypertension  Assessment & Plan  Continue home carvedilol        Medical Problems       Resolved Problems  Date Reviewed: 10/15/2023            Resolved    * (Principal) Lymphangitis 10/17/2023     Resolved by  Keiko Loaiza MD        Discharging Physician / Practitioner: Keiko Loaiza MD  PCP: Camelia Mtz MD  Admission Date:   Admission Orders (From admission, onward)       Ordered        10/15/23 2254  Place in Observation  Once                          Discharge Date: 10/17/23    Consultations During Hospital Stay:  CM    Procedures Performed:   No orders to display         Significant Findings / Test Results:   Lymphangitis     Incidental Findings:   none       Test Results Pending at Discharge (will require follow up):   [facility]     Outpatient Tests Requested:  none    Complications:  none known at this time    Reason for Admission: lymphangitis    Hospital Course:   Bhumi Vila is a 59 y.o. male patient who originally presented to the hospital on 10/15/2023 due to streaking up his left arm after being seen in the urgent care due to paronychia where an I&D was performed and sent home on Keflex. In the ED was started on cefepime and continued throughout his stay. Lymphangitis resolved paronychia had improved. Blood cultures showing no growth to date after 24 hours. He will be discharged on Levaquin for 5 days and Florastor for 8 days. He is to follow-up with his PCP. He is otherwise remained hemodynamically stable afebrile in no acute respiratory distress. Has been medically cleared for discharge. Please see above list of diagnoses and related plan for additional information. Condition at Discharge: stable    Discharge Day Visit / Exam:   Subjective: Kathya Gomes was seen and examined at bedside. No acute events overnight. Discussed plan of care. All questions and concerns were answered and addressed. Swelling has significantly improved since admission. Lymphangitis has resolved. Still has some erythema around his left third digit however has significantly improved since admission. Discussed that he will be discharged with Levaquin and that he is to take a probiotic or eat yogurt for 3 days following cessation of antibiotics. Vitals: Blood Pressure: 142/94 (10/17/23 0723)  Pulse: 70 (10/17/23 0723)  Temperature: (!) 97 °F (36.1 °C) (10/17/23 0723)  Temp Source: Temporal (10/16/23 0705)  Respirations: 18 (10/17/23 0723)  Height: 6' (182.9 cm) (10/15/23 1712)  Weight - Scale: 102 kg (225 lb) (10/15/23 1712)  SpO2: 95 % (10/17/23 0723)  Exam:   Physical Exam   Vitals and nursing note reviewed. Constitutional:       General: He is not in acute distress. Appearance: He is not ill-appearing. HENT:      Head: Normocephalic and atraumatic. Cardiovascular:      Rate and Rhythm: Normal rate and regular rhythm. Pulses: Normal pulses. Heart sounds: Normal heart sounds.    Pulmonary:      Effort: Pulmonary effort is normal.      Breath sounds: Normal breath sounds. Abdominal:      General: Abdomen is flat. Bowel sounds are normal.      Palpations: Abdomen is soft. Musculoskeletal:      Right lower leg: No edema. Left lower leg: No edema. Skin:     General: Skin is warm. Findings: No erythema or rash. Neurological:      General: No focal deficit present. Mental Status: He is alert and oriented to person, place, and time. Discussion with Family: Patient declined call to . Discharge instructions/Information to patient and family:   See after visit summary for information provided to patient and family. Provisions for Follow-Up Care:  See after visit summary for information related to follow-up care and any pertinent home health orders. Disposition:   Home    Planned Readmission: no     Discharge Statement:  I spent 40 minutes discharging the patient. This time was spent on the day of discharge. I had direct contact with the patient on the day of discharge. Greater than 50% of the total time was spent examining patient, answering all patient questions, arranging and discussing plan of care with patient as well as directly providing post-discharge instructions. Additional time then spent on discharge activities. Discharge Medications:  See after visit summary for reconciled discharge medications provided to patient and/or family.       **Please Note: This note may have been constructed using a voice recognition system**

## 2023-10-17 NOTE — DISCHARGE INSTR - AVS FIRST PAGE
You are to follow-up with your PCP later on this week or next week max    You will be discharged with Levaquin that you are to take for 5 days    You will be prescribed Florastor which is a probiotic that you are to take for 8 days, may substitute with yogurt however would recommend both

## 2023-10-17 NOTE — PLAN OF CARE
Problem: PAIN - ADULT  Goal: Verbalizes/displays adequate comfort level or baseline comfort level  Description: Interventions:  - Encourage patient to monitor pain and request assistance  - Assess pain using appropriate pain scale  - Administer analgesics based on type and severity of pain and evaluate response  - Implement non-pharmacological measures as appropriate and evaluate response  - Consider cultural and social influences on pain and pain management  - Notify physician/advanced practitioner if interventions unsuccessful or patient reports new pain  Outcome: Progressing     Problem: INFECTION - ADULT  Goal: Absence or prevention of progression during hospitalization  Description: INTERVENTIONS:  - Assess and monitor for signs and symptoms of infection  - Monitor lab/diagnostic results  - Monitor all insertion sites, i.e. indwelling lines, tubes, and drains  - Monitor endotracheal if appropriate and nasal secretions for changes in amount and color  - Neffs appropriate cooling/warming therapies per order  - Administer medications as ordered  - Instruct and encourage patient and family to use good hand hygiene technique  - Identify and instruct in appropriate isolation precautions for identified infection/condition  Outcome: Progressing

## 2023-10-21 LAB
BACTERIA BLD CULT: NORMAL
BACTERIA BLD CULT: NORMAL

## 2024-01-23 ENCOUNTER — HOSPITAL ENCOUNTER (OUTPATIENT)
Dept: RADIOLOGY | Age: 59
Discharge: HOME | End: 2024-01-23
Payer: COMMERCIAL

## 2024-01-23 DIAGNOSIS — S46.012D STRAIN OF TENDON OF LEFT ROTATOR CUFF, SUBSEQUENT ENCOUNTER: ICD-10-CM

## 2024-01-23 PROCEDURE — 73221 MRI JOINT UPR EXTREM W/O DYE: CPT

## (undated) DEVICE — SURGICAL GOWN, XL SMARTSLEEVE: Brand: CONVERTORS

## (undated) DEVICE — BANDAGE, ESMARK LF STR 6"X9' (20/CS): Brand: CYPRESS

## (undated) DEVICE — BLADE SHAVER DISSECTOR  4MM 13CM CRV COOLCUT

## (undated) DEVICE — BETHLEHEM UNIV MAJ EXT ,KIT: Brand: CARDINAL HEALTH

## (undated) DEVICE — TUBING SUCTION 5MM X 12 FT

## (undated) DEVICE — TUBING ARTHROSCOPIC WAVE  MAIN PUMP

## (undated) DEVICE — ACE WRAP 6 IN UNSTERILE

## (undated) DEVICE — ARTHROSCOPY FLOOR MAT

## (undated) DEVICE — CHLORAPREP HI-LITE 26ML ORANGE

## (undated) DEVICE — DISPOSABLE OR TOWEL: Brand: CARDINAL HEALTH

## (undated) DEVICE — CUFF TOURNIQUET 30 X 4 IN QUICK CONNECT DISP 1BLA

## (undated) DEVICE — SPONGE SCRUB 4 PCT CHLORHEXIDINE

## (undated) DEVICE — GLOVE INDICATOR PI UNDERGLOVE SZ 7 BLUE

## (undated) DEVICE — PUDDLE VAC

## (undated) DEVICE — GLOVE INDICATOR PI UNDERGLOVE SZ 8.5 BLUE

## (undated) DEVICE — NEEDLE SPINAL18G X 3.5 IN QUINCKE

## (undated) DEVICE — GLOVE SRG BIOGEL 7

## (undated) DEVICE — 3M™ STERI-STRIP™ REINFORCED ADHESIVE SKIN CLOSURES, R1547, 1/2 IN X 4 IN (12 MM X 100 MM), 6 STRIPS/ENVELOPE: Brand: 3M™ STERI-STRIP™

## (undated) DEVICE — GLOVE SRG BIOGEL 8.5

## (undated) DEVICE — SYRINGE 30ML LL

## (undated) DEVICE — OCCLUSIVE GAUZE STRIP,3% BISMUTH TRIBROMOPHENATE IN PETROLATUM BLEND: Brand: XEROFORM

## (undated) DEVICE — INTENDED FOR TISSUE SEPARATION, AND OTHER PROCEDURES THAT REQUIRE A SHARP SURGICAL BLADE TO PUNCTURE OR CUT.: Brand: BARD-PARKER ® CARBON RIB-BACK BLADES

## (undated) DEVICE — SUT MONOCRYL 4-0 PS-2 27 IN Y426H